# Patient Record
Sex: MALE | Race: WHITE | NOT HISPANIC OR LATINO
[De-identification: names, ages, dates, MRNs, and addresses within clinical notes are randomized per-mention and may not be internally consistent; named-entity substitution may affect disease eponyms.]

---

## 2017-05-04 ENCOUNTER — APPOINTMENT (OUTPATIENT)
Dept: UROLOGY | Facility: CLINIC | Age: 82
End: 2017-05-04

## 2017-05-04 VITALS
HEART RATE: 74 BPM | HEIGHT: 69 IN | WEIGHT: 178 LBS | BODY MASS INDEX: 26.36 KG/M2 | OXYGEN SATURATION: 98 % | DIASTOLIC BLOOD PRESSURE: 80 MMHG | SYSTOLIC BLOOD PRESSURE: 120 MMHG | RESPIRATION RATE: 14 BRPM | TEMPERATURE: 98.1 F

## 2017-05-08 LAB — PSA SERPL-MCNC: 0.15 NG/ML

## 2017-05-15 ENCOUNTER — FORM ENCOUNTER (OUTPATIENT)
Age: 82
End: 2017-05-15

## 2017-05-16 ENCOUNTER — APPOINTMENT (OUTPATIENT)
Dept: ULTRASOUND IMAGING | Facility: CLINIC | Age: 82
End: 2017-05-16

## 2017-05-16 ENCOUNTER — OUTPATIENT (OUTPATIENT)
Dept: OUTPATIENT SERVICES | Facility: HOSPITAL | Age: 82
LOS: 1 days | End: 2017-05-16
Payer: MEDICARE

## 2017-05-16 DIAGNOSIS — Z00.8 ENCOUNTER FOR OTHER GENERAL EXAMINATION: ICD-10-CM

## 2017-05-16 PROCEDURE — 76770 US EXAM ABDO BACK WALL COMP: CPT

## 2017-08-24 ENCOUNTER — APPOINTMENT (OUTPATIENT)
Dept: INTERNAL MEDICINE | Facility: CLINIC | Age: 82
End: 2017-08-24

## 2017-11-02 ENCOUNTER — APPOINTMENT (OUTPATIENT)
Dept: UROLOGY | Facility: CLINIC | Age: 82
End: 2017-11-02
Payer: MEDICARE

## 2017-11-02 VITALS
DIASTOLIC BLOOD PRESSURE: 64 MMHG | TEMPERATURE: 97.6 F | OXYGEN SATURATION: 98 % | HEART RATE: 72 BPM | SYSTOLIC BLOOD PRESSURE: 102 MMHG

## 2017-11-02 PROCEDURE — 99213 OFFICE O/P EST LOW 20 MIN: CPT

## 2017-11-03 LAB — PSA SERPL-MCNC: 0.14 NG/ML

## 2018-04-19 ENCOUNTER — APPOINTMENT (OUTPATIENT)
Dept: UROLOGY | Facility: CLINIC | Age: 83
End: 2018-04-19
Payer: MEDICARE

## 2018-04-19 VITALS
HEIGHT: 69 IN | HEART RATE: 71 BPM | OXYGEN SATURATION: 98 % | SYSTOLIC BLOOD PRESSURE: 130 MMHG | DIASTOLIC BLOOD PRESSURE: 70 MMHG

## 2018-04-19 PROCEDURE — 99213 OFFICE O/P EST LOW 20 MIN: CPT

## 2018-04-20 LAB — PSA SERPL-MCNC: 0.12 NG/ML

## 2018-05-07 ENCOUNTER — FORM ENCOUNTER (OUTPATIENT)
Age: 83
End: 2018-05-07

## 2018-05-08 ENCOUNTER — APPOINTMENT (OUTPATIENT)
Dept: ULTRASOUND IMAGING | Facility: CLINIC | Age: 83
End: 2018-05-08
Payer: MEDICARE

## 2018-05-08 ENCOUNTER — OUTPATIENT (OUTPATIENT)
Dept: OUTPATIENT SERVICES | Facility: HOSPITAL | Age: 83
LOS: 1 days | End: 2018-05-08
Payer: MEDICARE

## 2018-05-08 DIAGNOSIS — C61 MALIGNANT NEOPLASM OF PROSTATE: ICD-10-CM

## 2018-05-08 DIAGNOSIS — Z00.8 ENCOUNTER FOR OTHER GENERAL EXAMINATION: ICD-10-CM

## 2018-05-08 PROCEDURE — 76775 US EXAM ABDO BACK WALL LIM: CPT | Mod: 26

## 2018-05-08 PROCEDURE — 76775 US EXAM ABDO BACK WALL LIM: CPT

## 2018-11-14 ENCOUNTER — APPOINTMENT (OUTPATIENT)
Dept: UROLOGY | Facility: CLINIC | Age: 83
End: 2018-11-14
Payer: MEDICARE

## 2018-11-14 VITALS
TEMPERATURE: 98.2 F | OXYGEN SATURATION: 98 % | DIASTOLIC BLOOD PRESSURE: 76 MMHG | BODY MASS INDEX: 26.36 KG/M2 | WEIGHT: 178 LBS | HEART RATE: 74 BPM | RESPIRATION RATE: 14 BRPM | SYSTOLIC BLOOD PRESSURE: 130 MMHG | HEIGHT: 69 IN

## 2018-11-14 DIAGNOSIS — Z86.79 PERSONAL HISTORY OF OTHER DISEASES OF THE CIRCULATORY SYSTEM: ICD-10-CM

## 2018-11-14 DIAGNOSIS — Z86.39 PERSONAL HISTORY OF OTHER ENDOCRINE, NUTRITIONAL AND METABOLIC DISEASE: ICD-10-CM

## 2018-11-14 PROCEDURE — 99213 OFFICE O/P EST LOW 20 MIN: CPT

## 2018-11-15 LAB — PSA SERPL-MCNC: 0.1 NG/ML

## 2018-12-02 ENCOUNTER — FORM ENCOUNTER (OUTPATIENT)
Age: 83
End: 2018-12-02

## 2018-12-03 ENCOUNTER — OUTPATIENT (OUTPATIENT)
Dept: OUTPATIENT SERVICES | Facility: HOSPITAL | Age: 83
LOS: 1 days | End: 2018-12-03
Payer: MEDICARE

## 2018-12-03 ENCOUNTER — APPOINTMENT (OUTPATIENT)
Dept: ULTRASOUND IMAGING | Facility: CLINIC | Age: 83
End: 2018-12-03
Payer: MEDICARE

## 2018-12-03 DIAGNOSIS — Z00.8 ENCOUNTER FOR OTHER GENERAL EXAMINATION: ICD-10-CM

## 2018-12-03 PROCEDURE — 76775 US EXAM ABDO BACK WALL LIM: CPT

## 2018-12-03 PROCEDURE — 76775 US EXAM ABDO BACK WALL LIM: CPT | Mod: 26

## 2019-04-18 ENCOUNTER — APPOINTMENT (OUTPATIENT)
Dept: UROLOGY | Facility: CLINIC | Age: 84
End: 2019-04-18

## 2019-05-15 ENCOUNTER — APPOINTMENT (OUTPATIENT)
Dept: UROLOGY | Facility: CLINIC | Age: 84
End: 2019-05-15

## 2019-05-29 ENCOUNTER — APPOINTMENT (OUTPATIENT)
Dept: UROLOGY | Facility: CLINIC | Age: 84
End: 2019-05-29
Payer: MEDICARE

## 2019-05-29 VITALS
BODY MASS INDEX: 26.36 KG/M2 | WEIGHT: 178 LBS | DIASTOLIC BLOOD PRESSURE: 70 MMHG | HEART RATE: 64 BPM | OXYGEN SATURATION: 98 % | RESPIRATION RATE: 14 BRPM | HEIGHT: 69 IN | SYSTOLIC BLOOD PRESSURE: 130 MMHG

## 2019-05-29 PROCEDURE — 99213 OFFICE O/P EST LOW 20 MIN: CPT

## 2019-05-29 NOTE — HISTORY OF PRESENT ILLNESS
[FreeTextEntry1] : He is an 86-year-old who presents in follow-up for prostate cancer and incidental renal mass on observation. Urinary symptoms are not significantly changed which is nocturia 3 times. There is no hematuria or dysuria. There is no flank pain. PSA level was 0.1 in November 2018. Ultrasound in Dec 2018 showed increase in size of right renal mass, 3.4 and the other mass was stable at 2 cm. Residual urine was minimal before.\par Previous note: Ultrasound in 2017 showed a 17 mm right renal mass unchanged from before. Around 2005, he underwent  Cyberknide radiation therapy for prostate cancer.  In June 2016, he was hospitalized for GI bleed. MRI showed a right mid pole 2.3 cm solid lesion consistent with renal cell carcinoma.

## 2019-05-29 NOTE — ASSESSMENT
[FreeTextEntry1] : PSA level will be checked. From a urinary standpoint, he is doing well. Pending insurance approval, MRI of the abdomen will be ordered to reassess the renal masses. Since the size of the mass is increasing, I believe he should consider surgical treatment options. Partial, radical nephrectomy and other ablative minimally invasive options were discussed.

## 2019-05-29 NOTE — PHYSICAL EXAM
[General Appearance - Well Developed] : well developed [General Appearance - Well Nourished] : well nourished [Normal Appearance] : normal appearance [General Appearance - In No Acute Distress] : no acute distress [Well Groomed] : well groomed [Abdomen Tenderness] : non-tender [Abdomen Soft] : soft [Urethral Meatus] : meatus normal [Penis Abnormality] : normal uncircumcised penis [Costovertebral Angle Tenderness] : no ~M costovertebral angle tenderness [Urinary Bladder Findings] : the bladder was normal on palpation [Scrotum] : the scrotum was normal [Testes Mass (___cm)] : there were no testicular masses [Prostate Tenderness] : the prostate was not tender [Testes Tenderness] : no tenderness of the testes [FreeTextEntry1] :  RAMÓN done 11/2018 [No Prostate Nodules] : no prostate nodules [] : no respiratory distress [Oriented To Time, Place, And Person] : oriented to person, place, and time [Exaggerated Use Of Accessory Muscles For Inspiration] : no accessory muscle use [Respiration, Rhythm And Depth] : normal respiratory rhythm and effort [Not Anxious] : not anxious [Mood] : the mood was normal [Affect] : the affect was normal

## 2019-05-30 LAB — PSA SERPL-MCNC: 0.13 NG/ML

## 2019-06-11 ENCOUNTER — FORM ENCOUNTER (OUTPATIENT)
Age: 84
End: 2019-06-11

## 2019-06-12 ENCOUNTER — APPOINTMENT (OUTPATIENT)
Dept: MRI IMAGING | Facility: CLINIC | Age: 84
End: 2019-06-12
Payer: MEDICARE

## 2019-06-12 ENCOUNTER — OUTPATIENT (OUTPATIENT)
Dept: OUTPATIENT SERVICES | Facility: HOSPITAL | Age: 84
LOS: 1 days | End: 2019-06-12
Payer: MEDICARE

## 2019-06-12 DIAGNOSIS — N28.89 OTHER SPECIFIED DISORDERS OF KIDNEY AND URETER: ICD-10-CM

## 2019-06-12 DIAGNOSIS — Z00.8 ENCOUNTER FOR OTHER GENERAL EXAMINATION: ICD-10-CM

## 2019-06-12 PROCEDURE — 74183 MRI ABD W/O CNTR FLWD CNTR: CPT

## 2019-06-12 PROCEDURE — 74183 MRI ABD W/O CNTR FLWD CNTR: CPT | Mod: 26

## 2019-06-12 PROCEDURE — A9585: CPT

## 2019-06-18 ENCOUNTER — APPOINTMENT (OUTPATIENT)
Dept: UROLOGY | Facility: CLINIC | Age: 84
End: 2019-06-18
Payer: MEDICARE

## 2019-06-18 DIAGNOSIS — R91.1 SOLITARY PULMONARY NODULE: ICD-10-CM

## 2019-06-18 PROCEDURE — 99215 OFFICE O/P EST HI 40 MIN: CPT

## 2019-06-18 NOTE — ASSESSMENT
[FreeTextEntry1] : Imaging reviewed.  Findings suspicious for metastatic right kidney cancer.\par Options include continued observation (palliative care) versus surgical resection of renal mass/lymph node with SBRT/resection of lung lesion versus systemic therapy.\par Recommend CT chest and bone scan to complete metastatic workup.\par Case to be presented at  tumor board.\par All questions answered.

## 2019-06-18 NOTE — PHYSICAL EXAM

## 2019-06-18 NOTE — HISTORY OF PRESENT ILLNESS
[FreeTextEntry1] : Referred for second opinion regarding Right renal mass.\par Has been followed for slowly enlarging solid upper pole mass.  In 2016 measured 2 cm.  Recently underwent MRI abdomen w/wo IV contrast.  Findings now show 4.3 cm enhancing renal mass.  Has associated right paracaval enlarged lymph node and 1.6 cm lung lesion.  \par \par Patient remains asymptomatic.  Denies hematuria, abdominal pain, flank pain.\par No anorexia, no weight loss.  \par \par Has prior hx of prostate cancer s/p SBRT at [place] in 2005.\par \par Prior abdominal surgeries include appendectomy and open cholecystectomy.

## 2019-06-30 ENCOUNTER — FORM ENCOUNTER (OUTPATIENT)
Age: 84
End: 2019-06-30

## 2019-07-01 ENCOUNTER — APPOINTMENT (OUTPATIENT)
Dept: NUCLEAR MEDICINE | Facility: IMAGING CENTER | Age: 84
End: 2019-07-01
Payer: MEDICARE

## 2019-07-01 ENCOUNTER — OUTPATIENT (OUTPATIENT)
Dept: OUTPATIENT SERVICES | Facility: HOSPITAL | Age: 84
LOS: 1 days | End: 2019-07-01
Payer: MEDICARE

## 2019-07-01 ENCOUNTER — APPOINTMENT (OUTPATIENT)
Dept: CT IMAGING | Facility: IMAGING CENTER | Age: 84
End: 2019-07-01
Payer: MEDICARE

## 2019-07-01 DIAGNOSIS — N28.89 OTHER SPECIFIED DISORDERS OF KIDNEY AND URETER: ICD-10-CM

## 2019-07-01 DIAGNOSIS — R91.1 SOLITARY PULMONARY NODULE: ICD-10-CM

## 2019-07-01 PROCEDURE — 78306 BONE IMAGING WHOLE BODY: CPT | Mod: 26

## 2019-07-01 PROCEDURE — 71250 CT THORAX DX C-: CPT

## 2019-07-01 PROCEDURE — 78306 BONE IMAGING WHOLE BODY: CPT

## 2019-07-01 PROCEDURE — 71250 CT THORAX DX C-: CPT | Mod: 26

## 2019-07-01 PROCEDURE — A9561: CPT

## 2019-07-10 ENCOUNTER — RESULT REVIEW (OUTPATIENT)
Age: 84
End: 2019-07-10

## 2019-09-16 ENCOUNTER — MESSAGE (OUTPATIENT)
Age: 84
End: 2019-09-16

## 2019-10-01 ENCOUNTER — APPOINTMENT (OUTPATIENT)
Dept: UROLOGY | Facility: CLINIC | Age: 84
End: 2019-10-01
Payer: MEDICARE

## 2019-10-01 DIAGNOSIS — R59.0 LOCALIZED ENLARGED LYMPH NODES: ICD-10-CM

## 2019-10-01 PROCEDURE — 99215 OFFICE O/P EST HI 40 MIN: CPT

## 2019-10-06 ENCOUNTER — FORM ENCOUNTER (OUTPATIENT)
Age: 84
End: 2019-10-06

## 2019-10-07 ENCOUNTER — APPOINTMENT (OUTPATIENT)
Dept: CT IMAGING | Facility: CLINIC | Age: 84
End: 2019-10-07
Payer: MEDICARE

## 2019-10-07 ENCOUNTER — OUTPATIENT (OUTPATIENT)
Dept: OUTPATIENT SERVICES | Facility: HOSPITAL | Age: 84
LOS: 1 days | End: 2019-10-07
Payer: COMMERCIAL

## 2019-10-07 DIAGNOSIS — N28.89 OTHER SPECIFIED DISORDERS OF KIDNEY AND URETER: ICD-10-CM

## 2019-10-07 PROCEDURE — 74170 CT ABD WO CNTRST FLWD CNTRST: CPT | Mod: 26

## 2019-10-07 PROCEDURE — 74170 CT ABD WO CNTRST FLWD CNTRST: CPT

## 2019-10-07 PROCEDURE — 71260 CT THORAX DX C+: CPT | Mod: 26

## 2019-10-07 PROCEDURE — 82565 ASSAY OF CREATININE: CPT

## 2019-10-07 PROCEDURE — 71260 CT THORAX DX C+: CPT

## 2019-10-09 NOTE — ASSESSMENT
[FreeTextEntry1] : Images reviewed and findings discussed with the patient.  Recommend to proceed with minimally invasive laparoscopic partial nephrectomy, lymphadenectomy.  Reviewed operative procedure, hospital stay and recovery time.  Risks of surgery discussed including risks of bleeding (both immediate and delayed), urine leak, wound/abdominal infection, adjacent organ injury (bowel/vascular), conversion to open surgery, risk of renal loss, DVT/PE, and anesthetic complications.  Discussed the likelihood of both malignant and benign pathology.  \par \par All questions answered.  Patient agrees to proceed as recommended.  Will need repeat imaging given that last CT scan was 3 months ago.\par

## 2019-10-09 NOTE — HISTORY OF PRESENT ILLNESS
[FreeTextEntry1] : Previously on active surveillance for right renal mass.  \par Has been followed for slowly enlarging solid upper pole mass.  In 2016 measured 2 cm.  Recently underwent MRI abdomen w/wo IV contrast.  Findings now show 4.3 cm enhancing renal mass.  Has associated right paracaval enlarged lymph node and 1.6 cm lung lesion.   Initially recommended to undergo right partial nephrectomy with lymphadenectomy over the summer but the patient cancelled surgery.  Underwent second opinion with oncology who concurred with the recommendation for surgical excision.  Was seen by CT surgery at [place] for lung nodule.  \par \par Patient remains asymptomatic.  Denies hematuria, abdominal pain, flank pain.\par No anorexia, no weight loss.  \par \par Has prior hx of prostate cancer s/p SBRT at [place] in 2005.\par \par Prior abdominal surgeries include appendectomy and open cholecystectomy.

## 2019-10-22 ENCOUNTER — OUTPATIENT (OUTPATIENT)
Dept: OUTPATIENT SERVICES | Facility: HOSPITAL | Age: 84
LOS: 1 days | End: 2019-10-22
Payer: MEDICARE

## 2019-10-22 VITALS
HEART RATE: 72 BPM | DIASTOLIC BLOOD PRESSURE: 76 MMHG | RESPIRATION RATE: 14 BRPM | HEIGHT: 67.5 IN | TEMPERATURE: 97 F | WEIGHT: 153 LBS | SYSTOLIC BLOOD PRESSURE: 128 MMHG

## 2019-10-22 DIAGNOSIS — C64.9 MALIGNANT NEOPLASM OF UNSPECIFIED KIDNEY, EXCEPT RENAL PELVIS: ICD-10-CM

## 2019-10-22 DIAGNOSIS — Z01.818 ENCOUNTER FOR OTHER PREPROCEDURAL EXAMINATION: ICD-10-CM

## 2019-10-22 DIAGNOSIS — N28.89 OTHER SPECIFIED DISORDERS OF KIDNEY AND URETER: ICD-10-CM

## 2019-10-22 DIAGNOSIS — Z95.5 PRESENCE OF CORONARY ANGIOPLASTY IMPLANT AND GRAFT: ICD-10-CM

## 2019-10-22 DIAGNOSIS — Z95.5 PRESENCE OF CORONARY ANGIOPLASTY IMPLANT AND GRAFT: Chronic | ICD-10-CM

## 2019-10-22 DIAGNOSIS — Z96.649 PRESENCE OF UNSPECIFIED ARTIFICIAL HIP JOINT: Chronic | ICD-10-CM

## 2019-10-22 LAB
ALBUMIN SERPL ELPH-MCNC: 4.2 G/DL — SIGNIFICANT CHANGE UP (ref 3.3–5)
ALP SERPL-CCNC: 56 U/L — SIGNIFICANT CHANGE UP (ref 40–120)
ALT FLD-CCNC: 14 U/L — SIGNIFICANT CHANGE UP (ref 4–41)
ANION GAP SERPL CALC-SCNC: 12 MMO/L — SIGNIFICANT CHANGE UP (ref 7–14)
AST SERPL-CCNC: 18 U/L — SIGNIFICANT CHANGE UP (ref 4–40)
BILIRUB SERPL-MCNC: 0.2 MG/DL — SIGNIFICANT CHANGE UP (ref 0.2–1.2)
BLD GP AB SCN SERPL QL: NEGATIVE — SIGNIFICANT CHANGE UP
BUN SERPL-MCNC: 21 MG/DL — SIGNIFICANT CHANGE UP (ref 7–23)
CALCIUM SERPL-MCNC: 9.6 MG/DL — SIGNIFICANT CHANGE UP (ref 8.4–10.5)
CHLORIDE SERPL-SCNC: 103 MMOL/L — SIGNIFICANT CHANGE UP (ref 98–107)
CO2 SERPL-SCNC: 27 MMOL/L — SIGNIFICANT CHANGE UP (ref 22–31)
CREAT SERPL-MCNC: 0.92 MG/DL — SIGNIFICANT CHANGE UP (ref 0.5–1.3)
GLUCOSE SERPL-MCNC: 77 MG/DL — SIGNIFICANT CHANGE UP (ref 70–99)
HCT VFR BLD CALC: 37.4 % — LOW (ref 39–50)
HGB BLD-MCNC: 11.9 G/DL — LOW (ref 13–17)
MCHC RBC-ENTMCNC: 29 PG — SIGNIFICANT CHANGE UP (ref 27–34)
MCHC RBC-ENTMCNC: 31.8 % — LOW (ref 32–36)
MCV RBC AUTO: 91 FL — SIGNIFICANT CHANGE UP (ref 80–100)
NRBC # FLD: 0 K/UL — SIGNIFICANT CHANGE UP (ref 0–0)
PLATELET # BLD AUTO: 217 K/UL — SIGNIFICANT CHANGE UP (ref 150–400)
PMV BLD: 9.7 FL — SIGNIFICANT CHANGE UP (ref 7–13)
POTASSIUM SERPL-MCNC: 4.9 MMOL/L — SIGNIFICANT CHANGE UP (ref 3.5–5.3)
POTASSIUM SERPL-SCNC: 4.9 MMOL/L — SIGNIFICANT CHANGE UP (ref 3.5–5.3)
PROT SERPL-MCNC: 7.3 G/DL — SIGNIFICANT CHANGE UP (ref 6–8.3)
RBC # BLD: 4.11 M/UL — LOW (ref 4.2–5.8)
RBC # FLD: 14.8 % — HIGH (ref 10.3–14.5)
RH IG SCN BLD-IMP: POSITIVE — SIGNIFICANT CHANGE UP
SODIUM SERPL-SCNC: 142 MMOL/L — SIGNIFICANT CHANGE UP (ref 135–145)
WBC # BLD: 8.3 K/UL — SIGNIFICANT CHANGE UP (ref 3.8–10.5)
WBC # FLD AUTO: 8.3 K/UL — SIGNIFICANT CHANGE UP (ref 3.8–10.5)

## 2019-10-22 PROCEDURE — 93010 ELECTROCARDIOGRAM REPORT: CPT

## 2019-10-22 RX ORDER — UBIDECARENONE 100 MG
1 CAPSULE ORAL
Qty: 0 | Refills: 0 | DISCHARGE

## 2019-10-22 NOTE — H&P PST ADULT - NS PRO ABUSE SCREEN SUSPICION NEGLECT YN
-No acute intervention, not on any medications  -Unable to reach Dr. Vladimir Parry (neurologist) [phone]  -Continue with Baclofen 20 q6 hours (slight change in home medication) for spasticity
no

## 2019-10-22 NOTE — H&P PST ADULT - NSICDXPASTSURGICALHX_GEN_ALL_CORE_FT
PAST SURGICAL HISTORY:  History of total hip replacement left-2001, right-2016    Stented coronary artery 3 (1 is JUNIOR)-2001

## 2019-10-22 NOTE — H&P PST ADULT - NSICDXPASTMEDICALHX_GEN_ALL_CORE_FT
PAST MEDICAL HISTORY:  CAD (coronary artery disease)     Cancer of kidney     HTN (hypertension)     Hyperlipidemia

## 2019-10-22 NOTE — H&P PST ADULT - NSANTHOSAYNRD_GEN_A_CORE
No. FIDEL screening performed.  STOP BANG Legend: 0-2 = LOW Risk; 3-4 = INTERMEDIATE Risk; 5-8 = HIGH Risk

## 2019-10-22 NOTE — H&P PST ADULT - VISION (WITH CORRECTIVE LENSES IF THE PATIENT USUALLY WEARS THEM):
Anticoagulation Summary  As of 5/29/2018    INR goal:   2.5-3.5   TTR:   27.0 % (1.2 y)   Today's INR:   2.8   Warfarin maintenance plan:   7.5 mg (2.5 mg x 3) on Mon, Fri; 5 mg (2.5 mg x 2) all other days   Weekly warfarin total:   40 mg   Plan last modified:   Casey Birmingham, PharmD (5/25/2018)   Next INR check:   6/4/2018   Target end date:   Indefinite    Indications    Chronic anticoagulation [Z79.01]  History of mitral valve replacement with mechanical valve [Z95.2] [Z95.2]             Anticoagulation Episode Summary     INR check location:       Preferred lab:       Send INR reminders to:       Comments:         Anticoagulation Care Providers     Provider Role Specialty Phone number    Declan Eaton M.D. Referring Internal Medicine [phone]    [facility]tico[facility] Services Responsible  [phone]        Anticoagulation Patient Findings  Patient Findings     Negatives:   Signs/symptoms of thrombosis, Signs/symptoms of bleeding, Laboratory test error suspected, Change in health, Change in alcohol use, Change in activity, Upcoming invasive procedure, Emergency department visit, Upcoming dental procedure, Missed doses, Extra doses, Change in medications, Change in diet/appetite, Hospital admission, Bruising, Other complaints        HPI:   Carlos Rivera seen in clinic today, on anticoagulation therapy with warfarin for stroke prevention due to history of mechanical mitral valve replacement.    Patient's previous INR was subtherapeutic at 2.1 on 5-25-18, at which time patient was instructed to increase weekly warfarin regimen and begin lovenox bridge.  He returns to clinic today to recheck INR to ensure it is therapeutic and thus preventing possible clotting and/or bleeding/bruising complications.    CHADS-VASc = n/a  (unadjusted ischemic stroke risk/year:  n/a)    Does patient have any changes to current medical/health status since last appt (Y/N):  NO  Does patient have any signs/symptoms of bleeding  "and/or thrombosis since the last appt (Y/N):  NO  Does patient have any interval changes to diet or medications since last appt (Y/N):  NO  Are there any complications or cost restrictions with current therapy (Y/N):  NO      Vitals:  BP check declined at today's visit    Weight  Scale unavailable    Height   5' 8\"     Asssessment:      INR therapeutic at 2.8, therefore decreasing patient's risk of stroke and/or bleeding complications.   Reason(s) for out of range INR today:  n/a      Plan:  Pt is to continue with current warfarin dosing regimen in order to maintain INR in therapeutic range.     Follow up:  Because warfarin is a high risk medication and current CHEST guidelines recommend regular monitoring intervals (few days up to 12 weeks), will have patient return to clinic in 1 weeks to recheck INR.    Casey Birmingham, PharmD    " wears eyeglasses

## 2019-10-22 NOTE — H&P PST ADULT - NSICDXPROBLEM_GEN_ALL_CORE_FT
PROBLEM DIAGNOSES  Problem: Cancer of kidney  Assessment and Plan: Pt. is scheduled for a right laparoscopic partial nephrectomy, retroperitoneal lymphadenectomy 11/1/19.  Pt. verbalized understanding of instructions as discussed and outlined on the instruction sheets.  Pt. did teach back on Chlorhexidine wash.  Pt. to have medical clearance 10/24/19.      Problem: Stented coronary artery  Assessment and Plan: Instructed pt. to take last dose of Clopidogrel 10/26/19 and to continue baby ASA.  Informed pt's. Cardiologist who concurs.

## 2019-10-22 NOTE — H&P PST ADULT - ENT GEN HX ROS MEA POS PC
"when I sleep with my mouth open"/dry mouth hearing difficulty/dry mouth/"when I sleep with my mouth open"

## 2019-10-24 LAB
BACTERIA UR CULT: SIGNIFICANT CHANGE UP
SPECIMEN SOURCE: SIGNIFICANT CHANGE UP

## 2019-10-31 ENCOUNTER — TRANSCRIPTION ENCOUNTER (OUTPATIENT)
Age: 84
End: 2019-10-31

## 2019-10-31 PROBLEM — E78.5 HYPERLIPIDEMIA, UNSPECIFIED: Chronic | Status: ACTIVE | Noted: 2019-10-22

## 2019-10-31 PROBLEM — I25.10 ATHEROSCLEROTIC HEART DISEASE OF NATIVE CORONARY ARTERY WITHOUT ANGINA PECTORIS: Chronic | Status: ACTIVE | Noted: 2019-10-22

## 2019-10-31 PROBLEM — C64.9 MALIGNANT NEOPLASM OF UNSPECIFIED KIDNEY, EXCEPT RENAL PELVIS: Chronic | Status: ACTIVE | Noted: 2019-10-22

## 2019-10-31 PROBLEM — I10 ESSENTIAL (PRIMARY) HYPERTENSION: Chronic | Status: ACTIVE | Noted: 2019-10-22

## 2019-10-31 NOTE — ASU PATIENT PROFILE, ADULT - VISION (WITH CORRECTIVE LENSES IF THE PATIENT USUALLY WEARS THEM):
wears eyeglasses wears eyeglasses/Partially impaired: cannot see medication labels or newsprint, but can see obstacles in path, and the surrounding layout; can count fingers at arm's length

## 2019-10-31 NOTE — ASU PATIENT PROFILE, ADULT - PSH
History of total hip replacement  left-2001, right-2016  Stented coronary artery  3 (1 is JUNIOR)-2001

## 2019-11-01 ENCOUNTER — RESULT REVIEW (OUTPATIENT)
Age: 84
End: 2019-11-01

## 2019-11-01 ENCOUNTER — INPATIENT (INPATIENT)
Facility: HOSPITAL | Age: 84
LOS: 3 days | Discharge: HOME | End: 2019-11-05
Attending: UROLOGY | Admitting: UROLOGY
Payer: MEDICARE

## 2019-11-01 ENCOUNTER — APPOINTMENT (OUTPATIENT)
Dept: UROLOGY | Facility: HOSPITAL | Age: 84
End: 2019-11-01

## 2019-11-01 VITALS
HEART RATE: 69 BPM | DIASTOLIC BLOOD PRESSURE: 60 MMHG | TEMPERATURE: 98 F | HEIGHT: 67.5 IN | WEIGHT: 153 LBS | SYSTOLIC BLOOD PRESSURE: 150 MMHG | OXYGEN SATURATION: 100 % | RESPIRATION RATE: 16 BRPM

## 2019-11-01 DIAGNOSIS — R59.0 LOCALIZED ENLARGED LYMPH NODES: ICD-10-CM

## 2019-11-01 DIAGNOSIS — Z95.5 PRESENCE OF CORONARY ANGIOPLASTY IMPLANT AND GRAFT: Chronic | ICD-10-CM

## 2019-11-01 DIAGNOSIS — Z96.649 PRESENCE OF UNSPECIFIED ARTIFICIAL HIP JOINT: Chronic | ICD-10-CM

## 2019-11-01 DIAGNOSIS — C64.9 MALIGNANT NEOPLASM OF UNSPECIFIED KIDNEY, EXCEPT RENAL PELVIS: ICD-10-CM

## 2019-11-01 LAB
BASE EXCESS BLDA CALC-SCNC: 0.6 MMOL/L — SIGNIFICANT CHANGE UP
CA-I BLDA-SCNC: 1.2 MMOL/L — SIGNIFICANT CHANGE UP (ref 1.15–1.29)
GLUCOSE BLDA-MCNC: 141 MG/DL — HIGH (ref 70–99)
HCO3 BLDA-SCNC: 25 MMOL/L — SIGNIFICANT CHANGE UP (ref 22–26)
HCT VFR BLDA CALC: 29 % — LOW (ref 39–51)
HGB BLDA-MCNC: 9.3 G/DL — LOW (ref 13–17)
PCO2 BLDA: 43 MMHG — SIGNIFICANT CHANGE UP (ref 35–48)
PH BLDA: 7.38 PH — SIGNIFICANT CHANGE UP (ref 7.35–7.45)
PO2 BLDA: 198 MMHG — HIGH (ref 83–108)
POTASSIUM BLDA-SCNC: 3.8 MMOL/L — SIGNIFICANT CHANGE UP (ref 3.4–4.5)
RH IG SCN BLD-IMP: POSITIVE — SIGNIFICANT CHANGE UP
SAO2 % BLDA: 99.5 % — HIGH (ref 95–99)
SODIUM BLDA-SCNC: 137 MMOL/L — SIGNIFICANT CHANGE UP (ref 136–146)

## 2019-11-01 PROCEDURE — 38780 REMOVE ABDOMEN LYMPH NODES: CPT

## 2019-11-01 PROCEDURE — 88312 SPECIAL STAINS GROUP 1: CPT | Mod: 26

## 2019-11-01 PROCEDURE — 50543 LAPARO PARTIAL NEPHRECTOMY: CPT | Mod: RT

## 2019-11-01 PROCEDURE — 88307 TISSUE EXAM BY PATHOLOGIST: CPT | Mod: 26

## 2019-11-01 PROCEDURE — 76998 US GUIDE INTRAOP: CPT | Mod: 26

## 2019-11-01 PROCEDURE — 88305 TISSUE EXAM BY PATHOLOGIST: CPT | Mod: 26

## 2019-11-01 RX ORDER — OXYCODONE HYDROCHLORIDE 5 MG/1
10 TABLET ORAL EVERY 6 HOURS
Refills: 0 | Status: DISCONTINUED | OUTPATIENT
Start: 2019-11-01 | End: 2019-11-05

## 2019-11-01 RX ORDER — POLYETHYLENE GLYCOL 3350 17 G/17G
17 POWDER, FOR SOLUTION ORAL DAILY
Refills: 0 | Status: DISCONTINUED | OUTPATIENT
Start: 2019-11-01 | End: 2019-11-05

## 2019-11-01 RX ORDER — SIMVASTATIN 20 MG/1
20 TABLET, FILM COATED ORAL AT BEDTIME
Refills: 0 | Status: DISCONTINUED | OUTPATIENT
Start: 2019-11-01 | End: 2019-11-05

## 2019-11-01 RX ORDER — SENNA PLUS 8.6 MG/1
2 TABLET ORAL AT BEDTIME
Refills: 0 | Status: DISCONTINUED | OUTPATIENT
Start: 2019-11-01 | End: 2019-11-05

## 2019-11-01 RX ORDER — ASPIRIN/CALCIUM CARB/MAGNESIUM 324 MG
81 TABLET ORAL DAILY
Refills: 0 | Status: DISCONTINUED | OUTPATIENT
Start: 2019-11-01 | End: 2019-11-05

## 2019-11-01 RX ORDER — LISINOPRIL 2.5 MG/1
20 TABLET ORAL DAILY
Refills: 0 | Status: DISCONTINUED | OUTPATIENT
Start: 2019-11-01 | End: 2019-11-05

## 2019-11-01 RX ORDER — CEFAZOLIN SODIUM 1 G
2000 VIAL (EA) INJECTION EVERY 8 HOURS
Refills: 0 | Status: COMPLETED | OUTPATIENT
Start: 2019-11-01 | End: 2019-11-02

## 2019-11-01 RX ORDER — OXYCODONE HYDROCHLORIDE 5 MG/1
5 TABLET ORAL EVERY 6 HOURS
Refills: 0 | Status: DISCONTINUED | OUTPATIENT
Start: 2019-11-01 | End: 2019-11-05

## 2019-11-01 RX ORDER — UBIDECARENONE 100 MG
1 CAPSULE ORAL
Qty: 0 | Refills: 0 | DISCHARGE

## 2019-11-01 RX ORDER — ONDANSETRON 8 MG/1
4 TABLET, FILM COATED ORAL ONCE
Refills: 0 | Status: COMPLETED | OUTPATIENT
Start: 2019-11-01 | End: 2019-11-01

## 2019-11-01 RX ORDER — OMEPRAZOLE 10 MG/1
1 CAPSULE, DELAYED RELEASE ORAL
Qty: 0 | Refills: 0 | DISCHARGE

## 2019-11-01 RX ORDER — ASPIRIN/CALCIUM CARB/MAGNESIUM 324 MG
1 TABLET ORAL
Qty: 0 | Refills: 0 | DISCHARGE

## 2019-11-01 RX ORDER — CARVEDILOL PHOSPHATE 80 MG/1
1 CAPSULE, EXTENDED RELEASE ORAL
Qty: 0 | Refills: 0 | DISCHARGE

## 2019-11-01 RX ORDER — INFLUENZA VIRUS VACCINE 15; 15; 15; 15 UG/.5ML; UG/.5ML; UG/.5ML; UG/.5ML
0.5 SUSPENSION INTRAMUSCULAR ONCE
Refills: 0 | Status: DISCONTINUED | OUTPATIENT
Start: 2019-11-01 | End: 2019-11-05

## 2019-11-01 RX ORDER — RAMIPRIL 5 MG
1 CAPSULE ORAL
Qty: 0 | Refills: 0 | DISCHARGE

## 2019-11-01 RX ORDER — HYDROMORPHONE HYDROCHLORIDE 2 MG/ML
0.5 INJECTION INTRAMUSCULAR; INTRAVENOUS; SUBCUTANEOUS
Refills: 0 | Status: DISCONTINUED | OUTPATIENT
Start: 2019-11-01 | End: 2019-11-05

## 2019-11-01 RX ORDER — PANTOPRAZOLE SODIUM 20 MG/1
40 TABLET, DELAYED RELEASE ORAL
Refills: 0 | Status: DISCONTINUED | OUTPATIENT
Start: 2019-11-01 | End: 2019-11-05

## 2019-11-01 RX ORDER — HYDRALAZINE HCL 50 MG
10 TABLET ORAL ONCE
Refills: 0 | Status: COMPLETED | OUTPATIENT
Start: 2019-11-01 | End: 2019-11-01

## 2019-11-01 RX ORDER — HEPARIN SODIUM 5000 [USP'U]/ML
5000 INJECTION INTRAVENOUS; SUBCUTANEOUS EVERY 8 HOURS
Refills: 0 | Status: DISCONTINUED | OUTPATIENT
Start: 2019-11-01 | End: 2019-11-05

## 2019-11-01 RX ORDER — CARVEDILOL PHOSPHATE 80 MG/1
12.5 CAPSULE, EXTENDED RELEASE ORAL EVERY 12 HOURS
Refills: 0 | Status: DISCONTINUED | OUTPATIENT
Start: 2019-11-01 | End: 2019-11-05

## 2019-11-01 RX ORDER — SODIUM CHLORIDE 9 MG/ML
1000 INJECTION, SOLUTION INTRAVENOUS
Refills: 0 | Status: DISCONTINUED | OUTPATIENT
Start: 2019-11-01 | End: 2019-11-02

## 2019-11-01 RX ORDER — SIMVASTATIN 20 MG/1
1 TABLET, FILM COATED ORAL
Qty: 0 | Refills: 0 | DISCHARGE

## 2019-11-01 RX ORDER — ACETAMINOPHEN 500 MG
650 TABLET ORAL EVERY 6 HOURS
Refills: 0 | Status: DISCONTINUED | OUTPATIENT
Start: 2019-11-01 | End: 2019-11-05

## 2019-11-01 RX ADMIN — SIMVASTATIN 20 MILLIGRAM(S): 20 TABLET, FILM COATED ORAL at 21:33

## 2019-11-01 RX ADMIN — SENNA PLUS 2 TABLET(S): 8.6 TABLET ORAL at 21:33

## 2019-11-01 RX ADMIN — HYDROMORPHONE HYDROCHLORIDE 0.5 MILLIGRAM(S): 2 INJECTION INTRAMUSCULAR; INTRAVENOUS; SUBCUTANEOUS at 16:45

## 2019-11-01 RX ADMIN — Medication 100 MILLIGRAM(S): at 21:32

## 2019-11-01 RX ADMIN — HYDROMORPHONE HYDROCHLORIDE 0.5 MILLIGRAM(S): 2 INJECTION INTRAMUSCULAR; INTRAVENOUS; SUBCUTANEOUS at 16:30

## 2019-11-01 RX ADMIN — HEPARIN SODIUM 5000 UNIT(S): 5000 INJECTION INTRAVENOUS; SUBCUTANEOUS at 21:33

## 2019-11-01 RX ADMIN — Medication 10 MILLIGRAM(S): at 16:35

## 2019-11-01 NOTE — PROGRESS NOTE ADULT - SUBJECTIVE AND OBJECTIVE BOX
Note    Post op Check    s/p : R lap part nephx, RPLND    Pt seen / examined without complaints pain controlled    Vital Signs Last 24 Hrs  T(C): 36.3 (01 Nov 2019 20:20), Max: 36.8 (01 Nov 2019 11:00)  T(F): 97.3 (01 Nov 2019 20:20), Max: 98.2 (01 Nov 2019 11:00)  HR: 93 (01 Nov 2019 20:20) (66 - 93)  BP: 142/66 (01 Nov 2019 20:20) (114/55 - 188/84)  BP(mean): 69 (01 Nov 2019 19:15) (65 - 105)  RR: 15 (01 Nov 2019 20:20) (10 - 18)  SpO2: 98% (01 Nov 2019 20:20) (96% - 100%)    I&O's Summary    01 Nov 2019 07:01  -  01 Nov 2019 22:04  --------------------------------------------------------  IN: 230 mL / OUT: 345 mL / NET: -115 mL      Fol=450  FT=500    PHYSICAL EXAM:       Constitutional: awake alert NAD    Respiratory: no resp distress    Cardiovascular: RR    Gastrointestinal: soft,  appropriately tender, trocar sites CDI    Genitourinary: mckeon in place draining well    Extremities: + venodynes

## 2019-11-01 NOTE — PATIENT PROFILE ADULT - SAFE PLACE TO LIVE
Patient without complaints  passing flatus, tolerating diet  VS- wnl  Heart s1 s2 no murmur  Lungs clear bilateral  Abdomen incision clean dry and intact  positive bowel sounds  normal lochial flow  no calf tenderness  A/P- s/p csection, POD#2  Continue same management I have personally seen/examined the patient and agree with history, assessment, and plan as documented above. no

## 2019-11-02 LAB
ANION GAP SERPL CALC-SCNC: 11 MMO/L — SIGNIFICANT CHANGE UP (ref 7–14)
BUN SERPL-MCNC: 28 MG/DL — HIGH (ref 7–23)
CALCIUM SERPL-MCNC: 8.6 MG/DL — SIGNIFICANT CHANGE UP (ref 8.4–10.5)
CHLORIDE SERPL-SCNC: 103 MMOL/L — SIGNIFICANT CHANGE UP (ref 98–107)
CO2 SERPL-SCNC: 25 MMOL/L — SIGNIFICANT CHANGE UP (ref 22–31)
CREAT SERPL-MCNC: 1.61 MG/DL — HIGH (ref 0.5–1.3)
GLUCOSE SERPL-MCNC: 143 MG/DL — HIGH (ref 70–99)
HCT VFR BLD CALC: 27 % — LOW (ref 39–50)
HGB BLD-MCNC: 8.6 G/DL — LOW (ref 13–17)
MCHC RBC-ENTMCNC: 29.1 PG — SIGNIFICANT CHANGE UP (ref 27–34)
MCHC RBC-ENTMCNC: 31.9 % — LOW (ref 32–36)
MCV RBC AUTO: 91.2 FL — SIGNIFICANT CHANGE UP (ref 80–100)
NRBC # FLD: 0 K/UL — SIGNIFICANT CHANGE UP (ref 0–0)
PLATELET # BLD AUTO: 190 K/UL — SIGNIFICANT CHANGE UP (ref 150–400)
PMV BLD: 9.9 FL — SIGNIFICANT CHANGE UP (ref 7–13)
POTASSIUM SERPL-MCNC: 4.9 MMOL/L — SIGNIFICANT CHANGE UP (ref 3.5–5.3)
POTASSIUM SERPL-SCNC: 4.9 MMOL/L — SIGNIFICANT CHANGE UP (ref 3.5–5.3)
RBC # BLD: 2.96 M/UL — LOW (ref 4.2–5.8)
RBC # FLD: 14.8 % — HIGH (ref 10.3–14.5)
SODIUM SERPL-SCNC: 139 MMOL/L — SIGNIFICANT CHANGE UP (ref 135–145)
WBC # BLD: 12.42 K/UL — HIGH (ref 3.8–10.5)
WBC # FLD AUTO: 12.42 K/UL — HIGH (ref 3.8–10.5)

## 2019-11-02 RX ORDER — SODIUM CHLORIDE 9 MG/ML
1000 INJECTION, SOLUTION INTRAVENOUS
Refills: 0 | Status: DISCONTINUED | OUTPATIENT
Start: 2019-11-02 | End: 2019-11-03

## 2019-11-02 RX ADMIN — CARVEDILOL PHOSPHATE 12.5 MILLIGRAM(S): 80 CAPSULE, EXTENDED RELEASE ORAL at 06:16

## 2019-11-02 RX ADMIN — Medication 650 MILLIGRAM(S): at 12:41

## 2019-11-02 RX ADMIN — Medication 100 MILLIGRAM(S): at 15:08

## 2019-11-02 RX ADMIN — HEPARIN SODIUM 5000 UNIT(S): 5000 INJECTION INTRAVENOUS; SUBCUTANEOUS at 15:08

## 2019-11-02 RX ADMIN — CARVEDILOL PHOSPHATE 12.5 MILLIGRAM(S): 80 CAPSULE, EXTENDED RELEASE ORAL at 18:22

## 2019-11-02 RX ADMIN — Medication 81 MILLIGRAM(S): at 12:38

## 2019-11-02 RX ADMIN — PANTOPRAZOLE SODIUM 40 MILLIGRAM(S): 20 TABLET, DELAYED RELEASE ORAL at 06:16

## 2019-11-02 RX ADMIN — HEPARIN SODIUM 5000 UNIT(S): 5000 INJECTION INTRAVENOUS; SUBCUTANEOUS at 06:16

## 2019-11-02 RX ADMIN — LISINOPRIL 20 MILLIGRAM(S): 2.5 TABLET ORAL at 06:16

## 2019-11-02 RX ADMIN — POLYETHYLENE GLYCOL 3350 17 GRAM(S): 17 POWDER, FOR SOLUTION ORAL at 12:38

## 2019-11-02 RX ADMIN — SODIUM CHLORIDE 75 MILLILITER(S): 9 INJECTION, SOLUTION INTRAVENOUS at 10:00

## 2019-11-02 RX ADMIN — HEPARIN SODIUM 5000 UNIT(S): 5000 INJECTION INTRAVENOUS; SUBCUTANEOUS at 23:07

## 2019-11-02 RX ADMIN — SIMVASTATIN 20 MILLIGRAM(S): 20 TABLET, FILM COATED ORAL at 23:07

## 2019-11-02 RX ADMIN — Medication 650 MILLIGRAM(S): at 13:35

## 2019-11-02 RX ADMIN — Medication 100 MILLIGRAM(S): at 06:16

## 2019-11-02 NOTE — PROGRESS NOTE ADULT - SUBJECTIVE AND OBJECTIVE BOX
Subjective  Pt seen and examined at bedside. post op check was good no overnight events. pt feeling well. no n/v/d/f/c. mckeon removed    Objective    Vital signs  T(F): , Max: 98.2 (11-01-19 @ 11:00)  HR: 90 (11-02-19 @ 09:15)  BP: 115/55 (11-02-19 @ 09:15)  SpO2: 100% (11-02-19 @ 09:15)  Wt(kg): --    Output     OUT:    Bulb: 287.5 mL    Indwelling Catheter - Urethral: 650 mL  Total OUT: 937.5 mL    Total NET: -937.5 mL      OUT:    Bulb: 55 mL    Voided: 50 mL  Total OUT: 105 mL    Total NET: -105 mL          Gen: NAD  Abd: S/NT/ND, incisions c/d/i. JAMES ss to suction  : Mckeon removed CYU    Labs      11-02 @ 05:40    WBC 12.42 / Hct 27.0  / SCr 1.61

## 2019-11-02 NOTE — PROGRESS NOTE ADULT - ASSESSMENT
This is an 87 M POD 1 s/p R lap partial nephrectomy    Plan  -am labs reviewed  -oob/ambi/IS/dvt ppx LILIBETH  -CLD adv with GIF  -MIVF  -PVR  -f/u med  -pm dulcolax if no GIF

## 2019-11-03 LAB
ANION GAP SERPL CALC-SCNC: 9 MMO/L — SIGNIFICANT CHANGE UP (ref 7–14)
BUN SERPL-MCNC: 31 MG/DL — HIGH (ref 7–23)
CALCIUM SERPL-MCNC: 8.1 MG/DL — LOW (ref 8.4–10.5)
CHLORIDE SERPL-SCNC: 102 MMOL/L — SIGNIFICANT CHANGE UP (ref 98–107)
CO2 SERPL-SCNC: 23 MMOL/L — SIGNIFICANT CHANGE UP (ref 22–31)
CREAT FLD-MCNC: 2.11 MG/DL — SIGNIFICANT CHANGE UP
CREAT SERPL-MCNC: 2 MG/DL — HIGH (ref 0.5–1.3)
GLUCOSE SERPL-MCNC: 122 MG/DL — HIGH (ref 70–99)
HCT VFR BLD CALC: 23.9 % — LOW (ref 39–50)
HCT VFR BLD CALC: 26.7 % — LOW (ref 39–50)
HGB BLD-MCNC: 7.6 G/DL — LOW (ref 13–17)
HGB BLD-MCNC: 8.3 G/DL — LOW (ref 13–17)
MCHC RBC-ENTMCNC: 28.9 PG — SIGNIFICANT CHANGE UP (ref 27–34)
MCHC RBC-ENTMCNC: 29.2 PG — SIGNIFICANT CHANGE UP (ref 27–34)
MCHC RBC-ENTMCNC: 31.1 % — LOW (ref 32–36)
MCHC RBC-ENTMCNC: 31.8 % — LOW (ref 32–36)
MCV RBC AUTO: 91.9 FL — SIGNIFICANT CHANGE UP (ref 80–100)
MCV RBC AUTO: 93 FL — SIGNIFICANT CHANGE UP (ref 80–100)
NRBC # FLD: 0 K/UL — SIGNIFICANT CHANGE UP (ref 0–0)
NRBC # FLD: 0 K/UL — SIGNIFICANT CHANGE UP (ref 0–0)
PLATELET # BLD AUTO: 156 K/UL — SIGNIFICANT CHANGE UP (ref 150–400)
PLATELET # BLD AUTO: 193 K/UL — SIGNIFICANT CHANGE UP (ref 150–400)
PMV BLD: 10.2 FL — SIGNIFICANT CHANGE UP (ref 7–13)
PMV BLD: 10.2 FL — SIGNIFICANT CHANGE UP (ref 7–13)
POTASSIUM SERPL-MCNC: 4.1 MMOL/L — SIGNIFICANT CHANGE UP (ref 3.5–5.3)
POTASSIUM SERPL-SCNC: 4.1 MMOL/L — SIGNIFICANT CHANGE UP (ref 3.5–5.3)
RBC # BLD: 2.6 M/UL — LOW (ref 4.2–5.8)
RBC # BLD: 2.87 M/UL — LOW (ref 4.2–5.8)
RBC # FLD: 14.9 % — HIGH (ref 10.3–14.5)
RBC # FLD: 15 % — HIGH (ref 10.3–14.5)
SODIUM SERPL-SCNC: 134 MMOL/L — LOW (ref 135–145)
WBC # BLD: 11.69 K/UL — HIGH (ref 3.8–10.5)
WBC # BLD: 17.19 K/UL — HIGH (ref 3.8–10.5)
WBC # FLD AUTO: 11.69 K/UL — HIGH (ref 3.8–10.5)
WBC # FLD AUTO: 17.19 K/UL — HIGH (ref 3.8–10.5)

## 2019-11-03 RX ORDER — SODIUM CHLORIDE 9 MG/ML
1000 INJECTION, SOLUTION INTRAVENOUS
Refills: 0 | Status: DISCONTINUED | OUTPATIENT
Start: 2019-11-03 | End: 2019-11-04

## 2019-11-03 RX ADMIN — SODIUM CHLORIDE 75 MILLILITER(S): 9 INJECTION, SOLUTION INTRAVENOUS at 05:27

## 2019-11-03 RX ADMIN — SODIUM CHLORIDE 75 MILLILITER(S): 9 INJECTION, SOLUTION INTRAVENOUS at 18:58

## 2019-11-03 RX ADMIN — Medication 650 MILLIGRAM(S): at 08:26

## 2019-11-03 RX ADMIN — Medication 81 MILLIGRAM(S): at 12:23

## 2019-11-03 RX ADMIN — SIMVASTATIN 20 MILLIGRAM(S): 20 TABLET, FILM COATED ORAL at 21:38

## 2019-11-03 RX ADMIN — HEPARIN SODIUM 5000 UNIT(S): 5000 INJECTION INTRAVENOUS; SUBCUTANEOUS at 05:25

## 2019-11-03 RX ADMIN — SENNA PLUS 2 TABLET(S): 8.6 TABLET ORAL at 21:38

## 2019-11-03 RX ADMIN — LISINOPRIL 20 MILLIGRAM(S): 2.5 TABLET ORAL at 05:26

## 2019-11-03 RX ADMIN — HEPARIN SODIUM 5000 UNIT(S): 5000 INJECTION INTRAVENOUS; SUBCUTANEOUS at 21:38

## 2019-11-03 RX ADMIN — HEPARIN SODIUM 5000 UNIT(S): 5000 INJECTION INTRAVENOUS; SUBCUTANEOUS at 15:02

## 2019-11-03 RX ADMIN — Medication 650 MILLIGRAM(S): at 09:25

## 2019-11-03 RX ADMIN — PANTOPRAZOLE SODIUM 40 MILLIGRAM(S): 20 TABLET, DELAYED RELEASE ORAL at 05:26

## 2019-11-03 RX ADMIN — CARVEDILOL PHOSPHATE 12.5 MILLIGRAM(S): 80 CAPSULE, EXTENDED RELEASE ORAL at 05:26

## 2019-11-03 NOTE — PROGRESS NOTE ADULT - SUBJECTIVE AND OBJECTIVE BOX
Subjective  Pt seen and examined at bedside. No overnight events. Pt feeling well. +flatus, +BM. Voiding without difficulty. No n/v/d/f/c.    Objective    Vital signs  T(F): , Max: 98.9 (11-03-19 @ 01:40)  HR: 96 (11-03-19 @ 05:22)  BP: 147/66 (11-03-19 @ 05:22)  SpO2: 97% (11-03-19 @ 05:22)  Wt(kg): --    Output     OUT:    Bulb: 215 mL    Voided: 1215 mL  Total OUT: 1430 mL    Total NET: -1430 mL          Gen: NAD  Abd: soft, appropriately tender, nondistended. incisions c/d/i    Labs    11-03 @ 05:15    WBC 11.69 / Hct 23.9  / SCr 2.00     11-02 @ 05:40    WBC 12.42 / Hct 27.0  / SCr 1.61

## 2019-11-03 NOTE — PROGRESS NOTE ADULT - ASSESSMENT
87 year old man POD2 s/p right lap partial nephrectomy    Plan:  - AM labs reviewed  - JAMES Cr 2.11  - oob/ambi/IS/dvt ppx SQH  - reg diet  - IV lock  - f/u med

## 2019-11-04 DIAGNOSIS — Z29.9 ENCOUNTER FOR PROPHYLACTIC MEASURES, UNSPECIFIED: ICD-10-CM

## 2019-11-04 DIAGNOSIS — E78.5 HYPERLIPIDEMIA, UNSPECIFIED: ICD-10-CM

## 2019-11-04 DIAGNOSIS — C64.1 MALIGNANT NEOPLASM OF RIGHT KIDNEY, EXCEPT RENAL PELVIS: ICD-10-CM

## 2019-11-04 DIAGNOSIS — I10 ESSENTIAL (PRIMARY) HYPERTENSION: ICD-10-CM

## 2019-11-04 DIAGNOSIS — D62 ACUTE POSTHEMORRHAGIC ANEMIA: ICD-10-CM

## 2019-11-04 DIAGNOSIS — N17.9 ACUTE KIDNEY FAILURE, UNSPECIFIED: ICD-10-CM

## 2019-11-04 DIAGNOSIS — I25.10 ATHEROSCLEROTIC HEART DISEASE OF NATIVE CORONARY ARTERY WITHOUT ANGINA PECTORIS: ICD-10-CM

## 2019-11-04 LAB
ANION GAP SERPL CALC-SCNC: 9 MMO/L — SIGNIFICANT CHANGE UP (ref 7–14)
BLD GP AB SCN SERPL QL: NEGATIVE — SIGNIFICANT CHANGE UP
BUN SERPL-MCNC: 34 MG/DL — HIGH (ref 7–23)
CALCIUM SERPL-MCNC: 8.1 MG/DL — LOW (ref 8.4–10.5)
CHLORIDE SERPL-SCNC: 103 MMOL/L — SIGNIFICANT CHANGE UP (ref 98–107)
CO2 SERPL-SCNC: 24 MMOL/L — SIGNIFICANT CHANGE UP (ref 22–31)
CREAT SERPL-MCNC: 2.03 MG/DL — HIGH (ref 0.5–1.3)
GLUCOSE SERPL-MCNC: 107 MG/DL — HIGH (ref 70–99)
HCT VFR BLD CALC: 22.7 % — LOW (ref 39–50)
HCT VFR BLD CALC: 27.9 % — LOW (ref 39–50)
HGB BLD-MCNC: 7.1 G/DL — LOW (ref 13–17)
HGB BLD-MCNC: 8.8 G/DL — LOW (ref 13–17)
MCHC RBC-ENTMCNC: 28.6 PG — SIGNIFICANT CHANGE UP (ref 27–34)
MCHC RBC-ENTMCNC: 28.9 PG — SIGNIFICANT CHANGE UP (ref 27–34)
MCHC RBC-ENTMCNC: 31.3 % — LOW (ref 32–36)
MCHC RBC-ENTMCNC: 31.5 % — LOW (ref 32–36)
MCV RBC AUTO: 90.6 FL — SIGNIFICANT CHANGE UP (ref 80–100)
MCV RBC AUTO: 92.3 FL — SIGNIFICANT CHANGE UP (ref 80–100)
NRBC # FLD: 0 K/UL — SIGNIFICANT CHANGE UP (ref 0–0)
NRBC # FLD: 0 K/UL — SIGNIFICANT CHANGE UP (ref 0–0)
PLATELET # BLD AUTO: 166 K/UL — SIGNIFICANT CHANGE UP (ref 150–400)
PLATELET # BLD AUTO: 196 K/UL — SIGNIFICANT CHANGE UP (ref 150–400)
PMV BLD: 10.1 FL — SIGNIFICANT CHANGE UP (ref 7–13)
PMV BLD: 10.8 FL — SIGNIFICANT CHANGE UP (ref 7–13)
POTASSIUM SERPL-MCNC: 4.2 MMOL/L — SIGNIFICANT CHANGE UP (ref 3.5–5.3)
POTASSIUM SERPL-SCNC: 4.2 MMOL/L — SIGNIFICANT CHANGE UP (ref 3.5–5.3)
RBC # BLD: 2.46 M/UL — LOW (ref 4.2–5.8)
RBC # BLD: 3.08 M/UL — LOW (ref 4.2–5.8)
RBC # FLD: 15.1 % — HIGH (ref 10.3–14.5)
RBC # FLD: 15.9 % — HIGH (ref 10.3–14.5)
RH IG SCN BLD-IMP: POSITIVE — SIGNIFICANT CHANGE UP
SODIUM SERPL-SCNC: 136 MMOL/L — SIGNIFICANT CHANGE UP (ref 135–145)
WBC # BLD: 10.88 K/UL — HIGH (ref 3.8–10.5)
WBC # BLD: 11.41 K/UL — HIGH (ref 3.8–10.5)
WBC # FLD AUTO: 10.88 K/UL — HIGH (ref 3.8–10.5)
WBC # FLD AUTO: 11.41 K/UL — HIGH (ref 3.8–10.5)

## 2019-11-04 PROCEDURE — 86079 PHYS BLOOD BANK SERV AUTHRJ: CPT

## 2019-11-04 PROCEDURE — 99223 1ST HOSP IP/OBS HIGH 75: CPT

## 2019-11-04 RX ADMIN — HEPARIN SODIUM 5000 UNIT(S): 5000 INJECTION INTRAVENOUS; SUBCUTANEOUS at 06:14

## 2019-11-04 RX ADMIN — CARVEDILOL PHOSPHATE 12.5 MILLIGRAM(S): 80 CAPSULE, EXTENDED RELEASE ORAL at 06:14

## 2019-11-04 RX ADMIN — HEPARIN SODIUM 5000 UNIT(S): 5000 INJECTION INTRAVENOUS; SUBCUTANEOUS at 21:50

## 2019-11-04 RX ADMIN — PANTOPRAZOLE SODIUM 40 MILLIGRAM(S): 20 TABLET, DELAYED RELEASE ORAL at 06:15

## 2019-11-04 RX ADMIN — LISINOPRIL 20 MILLIGRAM(S): 2.5 TABLET ORAL at 06:15

## 2019-11-04 RX ADMIN — HEPARIN SODIUM 5000 UNIT(S): 5000 INJECTION INTRAVENOUS; SUBCUTANEOUS at 13:09

## 2019-11-04 RX ADMIN — SODIUM CHLORIDE 75 MILLILITER(S): 9 INJECTION, SOLUTION INTRAVENOUS at 15:31

## 2019-11-04 RX ADMIN — SIMVASTATIN 20 MILLIGRAM(S): 20 TABLET, FILM COATED ORAL at 21:50

## 2019-11-04 RX ADMIN — Medication 81 MILLIGRAM(S): at 11:31

## 2019-11-04 RX ADMIN — CARVEDILOL PHOSPHATE 12.5 MILLIGRAM(S): 80 CAPSULE, EXTENDED RELEASE ORAL at 17:47

## 2019-11-04 NOTE — CONSULT NOTE ADULT - SUBJECTIVE AND OBJECTIVE BOX
HPI:  87M h/o CAD s/p stents (2001, 2016), HTN, HLD, kidney cancer s/p R lap partial nephrectomy and RPLND 11/1. Pt currently feeling well, denies any pain at this time. He is passing gas and had a BM this morning. No n/v/d. Pt with h/o CAD and HTN that are well controlled on ASA/Plavix/Coreg/Ramipril/statin. No chest pain. Medicine consulted for co-management of anemia and HTN.     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 No tinnitus, vertigo; No sinus or throat pain  NECK: No pain or stiffness  BREASTS: No pain, masses, or nipple discharge  RESPIRATORY: No cough, wheezing, chills or hemoptysis; No shortness of breath  CARDIOVASCULAR: No chest pain, palpitations, dizziness, or leg swelling  GASTROINTESTINAL: No abdominal or epigastric pain. No nausea, vomiting, or hematemesis; No diarrhea or constipation. No melena or hematochezia.  GENITOURINARY: No dysuria, frequency, hematuria, or incontinence  NEUROLOGICAL: No headaches, memory loss, loss of strength, numbness, or tremors  SKIN: No itching, burning, rashes, or lesions   LYMPH NODES: No enlarged glands  ENDOCRINE: No heat or cold intolerance; No hair loss  MUSCULOSKELETAL: No joint pain or swelling; No muscle, back, or extremity pain  PSYCHIATRIC: No depression, anxiety, mood swings, or difficulty sleeping  HEME/LYMPH: No easy bruising, or bleeding gums  ALLERY AND IMMUNOLOGIC: No hives or eczema    Allergies    No Known Allergies      Social History:   Occasional alcohol   Former smoker     FAMILY HISTORY:  No family history of renal CA    HOME MEDICATIONS:  omeprazole 20 mg oral delayed release capsule: Last Dose Taken:  , 1 cap(s) orally once a day  ramipril 5 mg oral tablet: 1 tab(s) orally once a day  simvastatin 20 mg oral tablet: 1 tab(s) orally once a day  clopidogrel 75 mg oral tablet: Last Dose Taken:  , 1 tab(s) orally once a day  carvedilol 12.5 mg oral tablet: Last Dose Taken:  , 1 tab(s) orally 2 times a day  aspirin 81 mg oral tablet: Last Dose Taken:  , 1 tab(s) orally once a day  CoQ10: Last Dose Taken:  , 1 tab(s) orally once a day    Vital Signs Last 24 Hrs  T(C): 37.3 (04 Nov 2019 11:27), Max: 37.5 (03 Nov 2019 20:59)  T(F): 99.2 (04 Nov 2019 11:27), Max: 99.5 (03 Nov 2019 20:59)  HR: 87 (04 Nov 2019 11:27) (87 - 99)  BP: 97/43 (04 Nov 2019 11:27) (92/38 - 126/51)  BP(mean): --  RR: 16 (04 Nov 2019 11:27) (15 - 16)  SpO2: 98% (04 Nov 2019 11:27) (97% - 100%)  CAPILLARY BLOOD GLUCOSE        I&O's Summary    03 Nov 2019 07:01  -  04 Nov 2019 07:00  --------------------------------------------------------  IN: 925 mL / OUT: 1235 mL / NET: -310 mL    04 Nov 2019 07:01  -  04 Nov 2019 11:28  --------------------------------------------------------  IN: 0 mL / OUT: 375 mL / NET: -375 mL        PHYSICAL EXAM:  GENERAL: NAD  HEAD:  Atraumatic, Normocephalic  EYES: EOMI, PERRLA, conjunctiva and sclera clear  NECK: Supple, No JVD  CHEST/LUNG: Clear to auscultation bilaterally; No wheeze  HEART: Regular rate and rhythm; No murmurs, rubs, or gallops  ABDOMEN: Soft, Nontender, Nondistended; Bowel sounds present. Wounds c/d/i   EXTREMITIES:  2+ Peripheral Pulses, No clubbing, cyanosis, or edema  PSYCH: AAOx3  NEUROLOGY: non-focal  SKIN: No rashes or lesions    LABS:                        7.1    10.88 )-----------( 166      ( 04 Nov 2019 05:14 )             22.7     11-04    136  |  103  |  34<H>  ----------------------------<  107<H>  4.2   |  24  |  2.03<H>    Ca    8.1<L>      04 Nov 2019 05:14                RADIOLOGY & ADDITIONAL TESTS:    Imaging Personally Reviewed:    Consultant(s) Notes Reviewed:      Care Discussed with Consultants/Other Providers: HPI:  87M h/o CAD s/p stents (2001, 2016), HTN, HLD, kidney cancer s/p R lap partial nephrectomy and RPLND 11/1. Pt currently feeling well, denies any pain at this time. He is passing gas and had a BM this morning. No n/v/d. Pt with h/o CAD and HTN that are well controlled on ASA/Plavix/Coreg/Ramipril/statin. No chest pain. Medicine consulted for co-management of anemia and HTN.     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 No tinnitus, vertigo; No sinus or throat pain  NECK: No pain or stiffness  BREASTS: No pain, masses, or nipple discharge  RESPIRATORY: No cough, wheezing, chills or hemoptysis; No shortness of breath  CARDIOVASCULAR: No chest pain, palpitations, dizziness, or leg swelling  GASTROINTESTINAL: No abdominal or epigastric pain. No nausea, vomiting, or hematemesis; No diarrhea or constipation. No melena or hematochezia.  GENITOURINARY: No dysuria, frequency, hematuria, or incontinence  NEUROLOGICAL: No headaches, memory loss, loss of strength, numbness, or tremors  SKIN: No itching, burning, rashes, or lesions   LYMPH NODES: No enlarged glands  ENDOCRINE: No heat or cold intolerance; No hair loss  MUSCULOSKELETAL: No joint pain or swelling; No muscle, back, or extremity pain  PSYCHIATRIC: No depression, anxiety, mood swings, or difficulty sleeping  HEME/LYMPH: No easy bruising, or bleeding gums  ALLERY AND IMMUNOLOGIC: No hives or eczema    Allergies    No Known Allergies      Social History:   Occasional alcohol   Former smoker     FAMILY HISTORY:  No family history of renal CA    HOME MEDICATIONS:  omeprazole 20 mg oral delayed release capsule: Last Dose Taken:  , 1 cap(s) orally once a day  ramipril 5 mg oral tablet: 1 tab(s) orally once a day  simvastatin 20 mg oral tablet: 1 tab(s) orally once a day  clopidogrel 75 mg oral tablet: Last Dose Taken:  , 1 tab(s) orally once a day  carvedilol 12.5 mg oral tablet: Last Dose Taken:  , 1 tab(s) orally 2 times a day  aspirin 81 mg oral tablet: Last Dose Taken:  , 1 tab(s) orally once a day  CoQ10: Last Dose Taken:  , 1 tab(s) orally once a day    Vital Signs Last 24 Hrs  T(C): 37.3 (04 Nov 2019 11:27), Max: 37.5 (03 Nov 2019 20:59)  T(F): 99.2 (04 Nov 2019 11:27), Max: 99.5 (03 Nov 2019 20:59)  HR: 87 (04 Nov 2019 11:27) (87 - 99)  BP: 97/43 (04 Nov 2019 11:27) (92/38 - 126/51)  BP(mean): --  RR: 16 (04 Nov 2019 11:27) (15 - 16)  SpO2: 98% (04 Nov 2019 11:27) (97% - 100%)  CAPILLARY BLOOD GLUCOSE        I&O's Summary    03 Nov 2019 07:01  -  04 Nov 2019 07:00  --------------------------------------------------------  IN: 925 mL / OUT: 1235 mL / NET: -310 mL    04 Nov 2019 07:01  -  04 Nov 2019 11:28  --------------------------------------------------------  IN: 0 mL / OUT: 375 mL / NET: -375 mL        PHYSICAL EXAM:  GENERAL: NAD  HEAD:  Atraumatic, Normocephalic  EYES: EOMI, PERRLA, conjunctiva and sclera clear  NECK: Supple, No JVD  CHEST/LUNG: Clear to auscultation bilaterally; No wheeze  HEART: Regular rate and rhythm; No murmurs, rubs, or gallops  ABDOMEN: Soft, Nontender, Nondistended; Bowel sounds present. Wounds c/d/i   EXTREMITIES:  2+ Peripheral Pulses, No clubbing, cyanosis, or edema  PSYCH: AAOx3  NEUROLOGY: non-focal  SKIN: No rashes or lesions    LABS:                        7.1    10.88 )-----------( 166      ( 04 Nov 2019 05:14 )             22.7     11-04    136  |  103  |  34<H>  ----------------------------<  107<H>  4.2   |  24  |  2.03<H>    Ca    8.1<L>      04 Nov 2019 05:14                RADIOLOGY & ADDITIONAL TESTS:    Imaging Personally Reviewed:  EKG tracing reviewed and interpreted by me: ZAK, CHAVA    Consultant(s) Notes Reviewed:      Care Discussed with Consultants/Other Providers: Spoke w/ RODNEY DUBOIS (Korin), will transfuse 1unit PRBC

## 2019-11-04 NOTE — PROGRESS NOTE ADULT - SUBJECTIVE AND OBJECTIVE BOX
POD #3  Afeb 118/50 99 98%RA    Pt has no c/o  Abd- soft NT ND; + flatus      wounds C&D  Void 675  JAMES 8  Crit stable  Aisha reg diet

## 2019-11-04 NOTE — CONSULT NOTE ADULT - ASSESSMENT
87M h/o CAD s/p stents (2001, 2016), HTN, HLD, kidney cancer s/p R lap partial nephrectomy and RPLND 11/1.

## 2019-11-05 ENCOUNTER — MESSAGE (OUTPATIENT)
Age: 84
End: 2019-11-05

## 2019-11-05 ENCOUNTER — TRANSCRIPTION ENCOUNTER (OUTPATIENT)
Age: 84
End: 2019-11-05

## 2019-11-05 VITALS
DIASTOLIC BLOOD PRESSURE: 61 MMHG | SYSTOLIC BLOOD PRESSURE: 142 MMHG | OXYGEN SATURATION: 99 % | RESPIRATION RATE: 16 BRPM | HEART RATE: 89 BPM | TEMPERATURE: 98 F

## 2019-11-05 PROCEDURE — 99238 HOSP IP/OBS DSCHRG MGMT 30/<: CPT

## 2019-11-05 RX ORDER — CLOPIDOGREL BISULFATE 75 MG/1
1 TABLET, FILM COATED ORAL
Qty: 0 | Refills: 0 | DISCHARGE

## 2019-11-05 RX ORDER — ACETAMINOPHEN 500 MG
2 TABLET ORAL
Qty: 0 | Refills: 0 | DISCHARGE
Start: 2019-11-05

## 2019-11-05 RX ORDER — INFLUENZA VIRUS VACCINE 15; 15; 15; 15 UG/.5ML; UG/.5ML; UG/.5ML; UG/.5ML
0.5 SUSPENSION INTRAMUSCULAR ONCE
Refills: 0 | Status: COMPLETED | OUTPATIENT
Start: 2019-11-05 | End: 2019-11-05

## 2019-11-05 RX ADMIN — CARVEDILOL PHOSPHATE 12.5 MILLIGRAM(S): 80 CAPSULE, EXTENDED RELEASE ORAL at 06:53

## 2019-11-05 RX ADMIN — INFLUENZA VIRUS VACCINE 0.5 MILLILITER(S): 15; 15; 15; 15 SUSPENSION INTRAMUSCULAR at 09:53

## 2019-11-05 RX ADMIN — HEPARIN SODIUM 5000 UNIT(S): 5000 INJECTION INTRAVENOUS; SUBCUTANEOUS at 06:53

## 2019-11-05 RX ADMIN — PANTOPRAZOLE SODIUM 40 MILLIGRAM(S): 20 TABLET, DELAYED RELEASE ORAL at 06:53

## 2019-11-05 RX ADMIN — LISINOPRIL 20 MILLIGRAM(S): 2.5 TABLET ORAL at 06:53

## 2019-11-05 NOTE — DISCHARGE NOTE NURSING/CASE MANAGEMENT/SOCIAL WORK - NSDPDISTO_GEN_ALL_CORE
Home/stable, abdominal lap sites in place clean dry and intact, right side abd dsd at drain site inttact, tolerating diet, oob without difficulty, voiding well.

## 2019-11-05 NOTE — DISCHARGE NOTE PROVIDER - HOSPITAL COURSE
Pt had R. lap partial neph 4 days ago; did well; ambulating; voiding well;  passed gas and curtis reg diet; yesterday his crit dropped to 22.7 and 2 units packed cells    were given; crit now 27.9; JAMES removed; home today; f/u in 2 weeks

## 2019-11-05 NOTE — DISCHARGE NOTE NURSING/CASE MANAGEMENT/SOCIAL WORK - NSDCPNINST_GEN_ALL_CORE
Notify Dr Navarro if you experience any increase in pain  not relieved with pain medication, any nausea vomiting any redness drainage or swelling around  incision or any fever >100.5.  Drink plenty of fluids.  No heavy lifting or straining.  Use over the counter stool softeners to assist with constipation which can be a side effect of your pain medication.

## 2019-11-05 NOTE — PROGRESS NOTE ADULT - SUBJECTIVE AND OBJECTIVE BOX
POD #4  Afeb 144/60 100 99%RA    Pt has no c/o  Abd- soft NT ND; + flatus     wounds C&D  Voiding well  JAMES 10  Aisha reg diet  OOB  Recieved 1UPC yesterday; crit responded

## 2019-11-05 NOTE — PROGRESS NOTE ADULT - ASSESSMENT
87 year old man POD#4 s/p right lap partial nephrectomy; recieved 1 UPC yesterday for crit 22.7    Plan:  - check labs  - OOB  - f/u med  - home today

## 2019-11-05 NOTE — DISCHARGE NOTE NURSING/CASE MANAGEMENT/SOCIAL WORK - NSDCPECAREGIVERED_GEN_ALL_CORE
carenotes on lap partial nephrectomy, carenotes on d/c medications, managing pain at home handout carenotes on lap partial nephrectomy, managing pain at home handout, carenotes on d/c medications, urology medication side effects pamphlet carenotes onlap partial nephrectomy, side effects handout, d/c medication carenotes, managing pain at home handout

## 2019-11-05 NOTE — DISCHARGE NOTE NURSING/CASE MANAGEMENT/SOCIAL WORK - PATIENT PORTAL LINK FT
You can access the FollowMyHealth Patient Portal offered by [facility] by registering at the following website: [email or web address]. By joining [facility]’s FollowMyHealth portal, you will also be able to view your health information using other applications (apps) compatible with our system.

## 2019-11-05 NOTE — DISCHARGE NOTE PROVIDER - NSDCCPCAREPLAN_GEN_ALL_CORE_FT
PRINCIPAL DISCHARGE DIAGNOSIS  Diagnosis: Malignant neoplasm of right kidney  Assessment and Plan of Treatment: Drink plenty of fluids.  No heavy lifting (greater than 10 pounds) or straining for 4 to 6 weeks.  You may shower, just pat white strips dry, they will fall off in a few weeks. Change dressing at drain site daily or as needed until dry.   Do not take plavix until you see your PCP and he determines if you need it or not.  See Dr. Navarro in 2 weeks; Call office  to schedule a follow up appointment.  Call the office if you have fever greater than 101, difficulty urinating, pain not relieved with pain medication, nausea/vomiting..

## 2019-11-05 NOTE — DISCHARGE NOTE PROVIDER - NSDCMRMEDTOKEN_GEN_ALL_CORE_FT
acetaminophen 325 mg oral tablet: 2 tab(s) orally every 6 hours, As needed, Temp greater or equal to 38C (100.4F), Mild Pain (1 - 3)  aspirin 81 mg oral tablet: 1 tab(s) orally once a day  carvedilol 12.5 mg oral tablet: 1 tab(s) orally 2 times a day  CoQ10: 1 tab(s) orally once a day LD 10/25/19  omeprazole 20 mg oral delayed release capsule: 1 cap(s) orally once a day  ramipril 5 mg oral tablet: 1 tab(s) orally once a day  simvastatin 20 mg oral tablet: 1 tab(s) orally once a day

## 2019-11-05 NOTE — DISCHARGE NOTE PROVIDER - CARE PROVIDER_API CALL
Amando Navarro)  Urology  [address] [place] [address], [place] [address]  Phone: [phone]  Fax: [phone]  Follow Up Time:

## 2019-11-05 NOTE — DISCHARGE NOTE NURSING/CASE MANAGEMENT/SOCIAL WORK - NSDCPNDISPN_GEN_ALL_CORE
Activities of daily living, including home environment that might     exacerbate pain or reduce effectiveness of the pain management plan of care as well as strategies to address these issues/Education provided on the pain management plan of care/Side effects of pain management treatment Opioids not applicable/not prescribed/Side effects of pain management treatment/Education provided on the pain management plan of care/Activities of daily living, including home environment that might     exacerbate pain or reduce effectiveness of the pain management plan of care as well as strategies to address these issues

## 2019-11-07 LAB
APPEARANCE: ABNORMAL
BACTERIA: NEGATIVE
BILIRUBIN URINE: NEGATIVE
BLOOD URINE: ABNORMAL
COLOR: YELLOW
GLUCOSE QUALITATIVE U: NEGATIVE
HYALINE CASTS: 1 /LPF
KETONES URINE: NEGATIVE
LEUKOCYTE ESTERASE URINE: NEGATIVE
MICROSCOPIC-UA: NORMAL
NITRITE URINE: NEGATIVE
PH URINE: 6
PROTEIN URINE: ABNORMAL
RED BLOOD CELLS URINE: 112 /HPF
SPECIFIC GRAVITY URINE: 1.02
SQUAMOUS EPITHELIAL CELLS: 1 /HPF
UROBILINOGEN URINE: NORMAL
WHITE BLOOD CELLS URINE: 4 /HPF

## 2019-11-08 LAB
BACTERIA UR CULT: NORMAL
SURGICAL PATHOLOGY STUDY: SIGNIFICANT CHANGE UP

## 2019-11-11 ENCOUNTER — EMERGENCY (EMERGENCY)
Facility: HOSPITAL | Age: 84
LOS: 0 days | Discharge: ANOTHER HOSPITAL | End: 2019-11-12
Attending: STUDENT IN AN ORGANIZED HEALTH CARE EDUCATION/TRAINING PROGRAM
Payer: MEDICARE

## 2019-11-11 VITALS
HEIGHT: 67 IN | DIASTOLIC BLOOD PRESSURE: 55 MMHG | OXYGEN SATURATION: 98 % | TEMPERATURE: 99 F | SYSTOLIC BLOOD PRESSURE: 120 MMHG | HEART RATE: 96 BPM | WEIGHT: 162.92 LBS | RESPIRATION RATE: 18 BRPM

## 2019-11-11 DIAGNOSIS — Z96.649 PRESENCE OF UNSPECIFIED ARTIFICIAL HIP JOINT: Chronic | ICD-10-CM

## 2019-11-11 DIAGNOSIS — Z95.5 PRESENCE OF CORONARY ANGIOPLASTY IMPLANT AND GRAFT: Chronic | ICD-10-CM

## 2019-11-11 DIAGNOSIS — R53.1 WEAKNESS: ICD-10-CM

## 2019-11-11 DIAGNOSIS — Z85.528 PERSONAL HISTORY OF OTHER MALIGNANT NEOPLASM OF KIDNEY: ICD-10-CM

## 2019-11-11 DIAGNOSIS — R30.0 DYSURIA: ICD-10-CM

## 2019-11-11 DIAGNOSIS — R31.9 HEMATURIA, UNSPECIFIED: ICD-10-CM

## 2019-11-11 DIAGNOSIS — I25.10 ATHEROSCLEROTIC HEART DISEASE OF NATIVE CORONARY ARTERY WITHOUT ANGINA PECTORIS: ICD-10-CM

## 2019-11-11 DIAGNOSIS — Z79.82 LONG TERM (CURRENT) USE OF ASPIRIN: ICD-10-CM

## 2019-11-11 DIAGNOSIS — E78.5 HYPERLIPIDEMIA, UNSPECIFIED: ICD-10-CM

## 2019-11-11 DIAGNOSIS — D64.9 ANEMIA, UNSPECIFIED: ICD-10-CM

## 2019-11-11 DIAGNOSIS — I10 ESSENTIAL (PRIMARY) HYPERTENSION: ICD-10-CM

## 2019-11-11 PROCEDURE — 99291 CRITICAL CARE FIRST HOUR: CPT

## 2019-11-11 NOTE — ED ADULT TRIAGE NOTE - CHIEF COMPLAINT QUOTE
BIBA,  pt c/o difficulty urinating and urine in blood today,  pt states he had kidney surgery on 11/1/19 at [facility].   pt c/o incontinent.

## 2019-11-12 ENCOUNTER — INPATIENT (INPATIENT)
Facility: HOSPITAL | Age: 84
LOS: 6 days | Discharge: HOME HEALTH | End: 2019-11-19
Attending: UROLOGY | Admitting: UROLOGY
Payer: MEDICARE

## 2019-11-12 VITALS
SYSTOLIC BLOOD PRESSURE: 135 MMHG | TEMPERATURE: 98 F | HEART RATE: 78 BPM | OXYGEN SATURATION: 98 % | DIASTOLIC BLOOD PRESSURE: 58 MMHG | RESPIRATION RATE: 20 BRPM

## 2019-11-12 VITALS
DIASTOLIC BLOOD PRESSURE: 60 MMHG | OXYGEN SATURATION: 98 % | TEMPERATURE: 98 F | SYSTOLIC BLOOD PRESSURE: 139 MMHG | HEART RATE: 77 BPM | RESPIRATION RATE: 22 BRPM

## 2019-11-12 DIAGNOSIS — Z96.649 PRESENCE OF UNSPECIFIED ARTIFICIAL HIP JOINT: Chronic | ICD-10-CM

## 2019-11-12 DIAGNOSIS — R31.0 GROSS HEMATURIA: ICD-10-CM

## 2019-11-12 DIAGNOSIS — D62 ACUTE POSTHEMORRHAGIC ANEMIA: ICD-10-CM

## 2019-11-12 DIAGNOSIS — Z95.5 PRESENCE OF CORONARY ANGIOPLASTY IMPLANT AND GRAFT: Chronic | ICD-10-CM

## 2019-11-12 LAB
ABO RH CONFIRMATION: SIGNIFICANT CHANGE UP
ALBUMIN SERPL ELPH-MCNC: 2.4 G/DL — LOW (ref 3.3–5)
ALBUMIN SERPL ELPH-MCNC: 2.6 G/DL — LOW (ref 3.3–5)
ALP SERPL-CCNC: 54 U/L — SIGNIFICANT CHANGE UP (ref 40–120)
ALP SERPL-CCNC: 61 U/L — SIGNIFICANT CHANGE UP (ref 40–120)
ALT FLD-CCNC: 20 U/L — SIGNIFICANT CHANGE UP (ref 4–41)
ALT FLD-CCNC: 37 U/L — SIGNIFICANT CHANGE UP (ref 12–78)
ANION GAP SERPL CALC-SCNC: 7 MMOL/L — SIGNIFICANT CHANGE UP (ref 5–17)
ANION GAP SERPL CALC-SCNC: 8 MMO/L — SIGNIFICANT CHANGE UP (ref 7–14)
ANION GAP SERPL CALC-SCNC: 9 MMO/L — SIGNIFICANT CHANGE UP (ref 7–14)
ANISOCYTOSIS BLD QL: SLIGHT — SIGNIFICANT CHANGE UP
APPEARANCE UR: ABNORMAL
APTT BLD: 24.3 SEC — LOW (ref 27.5–36.3)
APTT BLD: 28.8 SEC — SIGNIFICANT CHANGE UP (ref 27.5–36.3)
APTT BLD: 28.9 SEC — SIGNIFICANT CHANGE UP (ref 27.5–36.3)
AST SERPL-CCNC: 16 U/L — SIGNIFICANT CHANGE UP (ref 4–40)
AST SERPL-CCNC: 30 U/L — SIGNIFICANT CHANGE UP (ref 15–37)
BACTERIA # UR AUTO: ABNORMAL
BASE EXCESS BLDA CALC-SCNC: -1.6 MMOL/L — SIGNIFICANT CHANGE UP
BASE EXCESS BLDA CALC-SCNC: -1.8 MMOL/L — SIGNIFICANT CHANGE UP
BASE EXCESS BLDA CALC-SCNC: -2.6 MMOL/L — SIGNIFICANT CHANGE UP
BASE EXCESS BLDA CALC-SCNC: -2.6 MMOL/L — SIGNIFICANT CHANGE UP
BASE EXCESS BLDA CALC-SCNC: -4.5 MMOL/L — SIGNIFICANT CHANGE UP
BASOPHILS # BLD AUTO: 0.01 K/UL — SIGNIFICANT CHANGE UP (ref 0–0.2)
BASOPHILS # BLD AUTO: 0.02 K/UL — SIGNIFICANT CHANGE UP (ref 0–0.2)
BASOPHILS NFR BLD AUTO: 0.1 % — SIGNIFICANT CHANGE UP (ref 0–2)
BASOPHILS NFR BLD AUTO: 0.2 % — SIGNIFICANT CHANGE UP (ref 0–2)
BILIRUB SERPL-MCNC: 0.3 MG/DL — SIGNIFICANT CHANGE UP (ref 0.2–1.2)
BILIRUB SERPL-MCNC: 0.4 MG/DL — SIGNIFICANT CHANGE UP (ref 0.2–1.2)
BILIRUB UR-MCNC: NEGATIVE — SIGNIFICANT CHANGE UP
BLD GP AB SCN SERPL QL: NEGATIVE — SIGNIFICANT CHANGE UP
BLD GP AB SCN SERPL QL: SIGNIFICANT CHANGE UP
BLOOD GAS ARTERIAL - FIO2: 40 — SIGNIFICANT CHANGE UP
BUN SERPL-MCNC: 27 MG/DL — HIGH (ref 7–23)
BUN SERPL-MCNC: 27 MG/DL — HIGH (ref 7–23)
BUN SERPL-MCNC: 35 MG/DL — HIGH (ref 7–23)
CA-I BLDA-SCNC: 1.07 MMOL/L — LOW (ref 1.15–1.29)
CA-I BLDA-SCNC: 1.16 MMOL/L — SIGNIFICANT CHANGE UP (ref 1.15–1.29)
CA-I BLDA-SCNC: 1.21 MMOL/L — SIGNIFICANT CHANGE UP (ref 1.15–1.29)
CA-I BLDA-SCNC: 1.3 MMOL/L — HIGH (ref 1.15–1.29)
CALCIUM SERPL-MCNC: 7.6 MG/DL — LOW (ref 8.4–10.5)
CALCIUM SERPL-MCNC: 8.2 MG/DL — LOW (ref 8.5–10.1)
CALCIUM SERPL-MCNC: 8.3 MG/DL — LOW (ref 8.4–10.5)
CHLORIDE SERPL-SCNC: 108 MMOL/L — HIGH (ref 98–107)
CHLORIDE SERPL-SCNC: 109 MMOL/L — HIGH (ref 98–107)
CHLORIDE SERPL-SCNC: 112 MMOL/L — HIGH (ref 96–108)
CO2 SERPL-SCNC: 21 MMOL/L — LOW (ref 22–31)
CO2 SERPL-SCNC: 22 MMOL/L — SIGNIFICANT CHANGE UP (ref 22–31)
CO2 SERPL-SCNC: 23 MMOL/L — SIGNIFICANT CHANGE UP (ref 22–31)
COLOR SPEC: ABNORMAL
CREAT SERPL-MCNC: 1.58 MG/DL — HIGH (ref 0.5–1.3)
CREAT SERPL-MCNC: 1.61 MG/DL — HIGH (ref 0.5–1.3)
CREAT SERPL-MCNC: 1.8 MG/DL — HIGH (ref 0.5–1.3)
DIFF PNL FLD: ABNORMAL
EOSINOPHIL # BLD AUTO: 0.23 K/UL — SIGNIFICANT CHANGE UP (ref 0–0.5)
EOSINOPHIL # BLD AUTO: 0.39 K/UL — SIGNIFICANT CHANGE UP (ref 0–0.5)
EOSINOPHIL NFR BLD AUTO: 1.9 % — SIGNIFICANT CHANGE UP (ref 0–6)
EOSINOPHIL NFR BLD AUTO: 3.4 % — SIGNIFICANT CHANGE UP (ref 0–6)
FIBRINOGEN PPP-MCNC: 522.9 MG/DL — HIGH (ref 350–510)
GLUCOSE BLDA-MCNC: 107 MG/DL — HIGH (ref 70–99)
GLUCOSE BLDA-MCNC: 107 MG/DL — HIGH (ref 70–99)
GLUCOSE BLDA-MCNC: 86 MG/DL — SIGNIFICANT CHANGE UP (ref 70–99)
GLUCOSE BLDA-MCNC: 97 MG/DL — SIGNIFICANT CHANGE UP (ref 70–99)
GLUCOSE BLDA-MCNC: 99 MG/DL — SIGNIFICANT CHANGE UP (ref 70–99)
GLUCOSE SERPL-MCNC: 112 MG/DL — HIGH (ref 70–99)
GLUCOSE SERPL-MCNC: 114 MG/DL — HIGH (ref 70–99)
GLUCOSE SERPL-MCNC: 91 MG/DL — SIGNIFICANT CHANGE UP (ref 70–99)
GLUCOSE UR QL: NEGATIVE MG/DL — SIGNIFICANT CHANGE UP
HCO3 BLDA-SCNC: 21 MMOL/L — LOW (ref 22–26)
HCO3 BLDA-SCNC: 22 MMOL/L — SIGNIFICANT CHANGE UP (ref 22–26)
HCO3 BLDA-SCNC: 22 MMOL/L — SIGNIFICANT CHANGE UP (ref 22–26)
HCO3 BLDA-SCNC: 23 MMOL/L — SIGNIFICANT CHANGE UP (ref 22–26)
HCO3 BLDA-SCNC: 23 MMOL/L — SIGNIFICANT CHANGE UP (ref 22–26)
HCT VFR BLD CALC: 17.8 % — CRITICAL LOW (ref 39–50)
HCT VFR BLD CALC: 20.6 % — CRITICAL LOW (ref 39–50)
HCT VFR BLD CALC: 28.6 % — LOW (ref 39–50)
HCT VFR BLD CALC: 34 % — LOW (ref 39–50)
HCT VFR BLDA CALC: 25.5 % — LOW (ref 39–51)
HCT VFR BLDA CALC: 26.3 % — LOW (ref 39–51)
HCT VFR BLDA CALC: 33 % — LOW (ref 39–51)
HCT VFR BLDA CALC: 33 % — LOW (ref 39–51)
HCT VFR BLDA CALC: 35.8 % — LOW (ref 39–51)
HGB BLD-MCNC: 11.7 G/DL — LOW (ref 13–17)
HGB BLD-MCNC: 5.8 G/DL — CRITICAL LOW (ref 13–17)
HGB BLD-MCNC: 6.5 G/DL — CRITICAL LOW (ref 13–17)
HGB BLD-MCNC: 9.3 G/DL — LOW (ref 13–17)
HGB BLDA-MCNC: 10.7 G/DL — LOW (ref 13–17)
HGB BLDA-MCNC: 10.7 G/DL — LOW (ref 13–17)
HGB BLDA-MCNC: 11.6 G/DL — LOW (ref 13–17)
HGB BLDA-MCNC: 8.2 G/DL — LOW (ref 13–17)
HGB BLDA-MCNC: 8.5 G/DL — LOW (ref 13–17)
HYPOCHROMIA BLD QL: SLIGHT — SIGNIFICANT CHANGE UP
IMM GRANULOCYTES NFR BLD AUTO: 0.4 % — SIGNIFICANT CHANGE UP (ref 0–1.5)
IMM GRANULOCYTES NFR BLD AUTO: 0.5 % — SIGNIFICANT CHANGE UP (ref 0–1.5)
INR BLD: 1.09 — SIGNIFICANT CHANGE UP (ref 0.88–1.17)
INR BLD: 1.1 — SIGNIFICANT CHANGE UP (ref 0.88–1.17)
INR BLD: 1.15 — SIGNIFICANT CHANGE UP (ref 0.88–1.17)
KETONES UR-MCNC: ABNORMAL
LACTATE BLDA-SCNC: 0.8 MMOL/L — SIGNIFICANT CHANGE UP (ref 0.5–2)
LACTATE BLDA-SCNC: 1 MMOL/L — SIGNIFICANT CHANGE UP (ref 0.5–2)
LACTATE BLDA-SCNC: 1 MMOL/L — SIGNIFICANT CHANGE UP (ref 0.5–2)
LACTATE SERPL-SCNC: 0.6 MMOL/L — LOW (ref 0.7–2)
LEUKOCYTE ESTERASE UR-ACNC: ABNORMAL
LIDOCAIN IGE QN: 265 U/L — SIGNIFICANT CHANGE UP (ref 73–393)
LYMPHOCYTES # BLD AUTO: 0.97 K/UL — LOW (ref 1–3.3)
LYMPHOCYTES # BLD AUTO: 1.26 K/UL — SIGNIFICANT CHANGE UP (ref 1–3.3)
LYMPHOCYTES # BLD AUTO: 10.7 % — LOW (ref 13–44)
LYMPHOCYTES # BLD AUTO: 8.5 % — LOW (ref 13–44)
MAGNESIUM SERPL-MCNC: 2.4 MG/DL — SIGNIFICANT CHANGE UP (ref 1.6–2.6)
MANUAL SMEAR VERIFICATION: YES — SIGNIFICANT CHANGE UP
MCHC RBC-ENTMCNC: 29.3 PG — SIGNIFICANT CHANGE UP (ref 27–34)
MCHC RBC-ENTMCNC: 29.8 PG — SIGNIFICANT CHANGE UP (ref 27–34)
MCHC RBC-ENTMCNC: 29.9 PG — SIGNIFICANT CHANGE UP (ref 27–34)
MCHC RBC-ENTMCNC: 30.2 PG — SIGNIFICANT CHANGE UP (ref 27–34)
MCHC RBC-ENTMCNC: 31.6 % — LOW (ref 32–36)
MCHC RBC-ENTMCNC: 32.5 % — SIGNIFICANT CHANGE UP (ref 32–36)
MCHC RBC-ENTMCNC: 32.6 GM/DL — SIGNIFICANT CHANGE UP (ref 32–36)
MCHC RBC-ENTMCNC: 34.4 % — SIGNIFICANT CHANGE UP (ref 32–36)
MCV RBC AUTO: 87.9 FL — SIGNIFICANT CHANGE UP (ref 80–100)
MCV RBC AUTO: 91.7 FL — SIGNIFICANT CHANGE UP (ref 80–100)
MCV RBC AUTO: 91.8 FL — SIGNIFICANT CHANGE UP (ref 80–100)
MCV RBC AUTO: 92.8 FL — SIGNIFICANT CHANGE UP (ref 80–100)
MICROCYTES BLD QL: SLIGHT — SIGNIFICANT CHANGE UP
MONOCYTES # BLD AUTO: 0.89 K/UL — SIGNIFICANT CHANGE UP (ref 0–0.9)
MONOCYTES # BLD AUTO: 0.94 K/UL — HIGH (ref 0–0.9)
MONOCYTES NFR BLD AUTO: 7.5 % — SIGNIFICANT CHANGE UP (ref 2–14)
MONOCYTES NFR BLD AUTO: 8.2 % — SIGNIFICANT CHANGE UP (ref 2–14)
NEUTROPHILS # BLD AUTO: 9.08 K/UL — HIGH (ref 1.8–7.4)
NEUTROPHILS # BLD AUTO: 9.35 K/UL — HIGH (ref 1.8–7.4)
NEUTROPHILS NFR BLD AUTO: 79.2 % — HIGH (ref 43–77)
NEUTROPHILS NFR BLD AUTO: 79.4 % — HIGH (ref 43–77)
NITRITE UR-MCNC: NEGATIVE — SIGNIFICANT CHANGE UP
NRBC # BLD: 0 /100 WBCS — SIGNIFICANT CHANGE UP (ref 0–0)
NRBC # FLD: 0 K/UL — SIGNIFICANT CHANGE UP (ref 0–0)
PCO2 BLDA: 33 MMHG — LOW (ref 35–48)
PCO2 BLDA: 36 MMHG — SIGNIFICANT CHANGE UP (ref 35–48)
PCO2 BLDA: 36 MMHG — SIGNIFICANT CHANGE UP (ref 35–48)
PCO2 BLDA: 46 MMHG — SIGNIFICANT CHANGE UP (ref 35–48)
PCO2 BLDA: 46 MMHG — SIGNIFICANT CHANGE UP (ref 35–48)
PH BLDA: 7.39 PH — SIGNIFICANT CHANGE UP (ref 7.35–7.45)
PH BLDA: 7.41 PH — SIGNIFICANT CHANGE UP (ref 7.35–7.45)
PH BLDA: 7.41 PH — SIGNIFICANT CHANGE UP (ref 7.35–7.45)
PH UR: 6.5 — SIGNIFICANT CHANGE UP (ref 5–8)
PHOSPHATE SERPL-MCNC: 4.3 MG/DL — SIGNIFICANT CHANGE UP (ref 2.5–4.5)
PLAT MORPH BLD: NORMAL — SIGNIFICANT CHANGE UP
PLATELET # BLD AUTO: 271 K/UL — SIGNIFICANT CHANGE UP (ref 150–400)
PLATELET # BLD AUTO: 281 K/UL — SIGNIFICANT CHANGE UP (ref 150–400)
PLATELET # BLD AUTO: 296 K/UL — SIGNIFICANT CHANGE UP (ref 150–400)
PLATELET # BLD AUTO: 365 K/UL — SIGNIFICANT CHANGE UP (ref 150–400)
PMV BLD: 9 FL — SIGNIFICANT CHANGE UP (ref 7–13)
PMV BLD: 9.1 FL — SIGNIFICANT CHANGE UP (ref 7–13)
PMV BLD: 9.2 FL — SIGNIFICANT CHANGE UP (ref 7–13)
PO2 BLDA: 253 MMHG — HIGH (ref 83–108)
PO2 BLDA: 264 MMHG — HIGH (ref 83–108)
PO2 BLDA: 70 MMHG — LOW (ref 83–108)
POTASSIUM BLDA-SCNC: 4 MMOL/L — SIGNIFICANT CHANGE UP (ref 3.4–4.5)
POTASSIUM BLDA-SCNC: 4.3 MMOL/L — SIGNIFICANT CHANGE UP (ref 3.4–4.5)
POTASSIUM BLDA-SCNC: 4.5 MMOL/L — SIGNIFICANT CHANGE UP (ref 3.4–4.5)
POTASSIUM BLDA-SCNC: 4.7 MMOL/L — HIGH (ref 3.4–4.5)
POTASSIUM BLDA-SCNC: 4.7 MMOL/L — HIGH (ref 3.4–4.5)
POTASSIUM SERPL-MCNC: 4.3 MMOL/L — SIGNIFICANT CHANGE UP (ref 3.5–5.3)
POTASSIUM SERPL-MCNC: 4.6 MMOL/L — SIGNIFICANT CHANGE UP (ref 3.5–5.3)
POTASSIUM SERPL-MCNC: 4.8 MMOL/L — SIGNIFICANT CHANGE UP (ref 3.5–5.3)
POTASSIUM SERPL-SCNC: 4.3 MMOL/L — SIGNIFICANT CHANGE UP (ref 3.5–5.3)
POTASSIUM SERPL-SCNC: 4.6 MMOL/L — SIGNIFICANT CHANGE UP (ref 3.5–5.3)
POTASSIUM SERPL-SCNC: 4.8 MMOL/L — SIGNIFICANT CHANGE UP (ref 3.5–5.3)
PROT SERPL-MCNC: 4.7 G/DL — LOW (ref 6–8.3)
PROT SERPL-MCNC: 5.4 GM/DL — LOW (ref 6–8.3)
PROT UR-MCNC: 500 MG/DL
PROTHROM AB SERPL-ACNC: 12.1 SEC — SIGNIFICANT CHANGE UP (ref 9.8–13.1)
PROTHROM AB SERPL-ACNC: 12.6 SEC — SIGNIFICANT CHANGE UP (ref 9.8–13.1)
PROTHROM AB SERPL-ACNC: 12.8 SEC — SIGNIFICANT CHANGE UP (ref 9.8–13.1)
RBC # BLD: 1.94 M/UL — LOW (ref 4.2–5.8)
RBC # BLD: 2.22 M/UL — LOW (ref 4.2–5.8)
RBC # BLD: 3.12 M/UL — LOW (ref 4.2–5.8)
RBC # BLD: 3.87 M/UL — LOW (ref 4.2–5.8)
RBC # FLD: 13.8 % — SIGNIFICANT CHANGE UP (ref 10.3–14.5)
RBC # FLD: 14.2 % — SIGNIFICANT CHANGE UP (ref 10.3–14.5)
RBC # FLD: 14.7 % — HIGH (ref 10.3–14.5)
RBC # FLD: 14.7 % — HIGH (ref 10.3–14.5)
RBC BLD AUTO: ABNORMAL
RBC CASTS # UR COMP ASSIST: >50 /HPF (ref 0–4)
RH IG SCN BLD-IMP: POSITIVE — SIGNIFICANT CHANGE UP
SAO2 % BLDA: 94.7 % — LOW (ref 95–99)
SAO2 % BLDA: 99.4 % — HIGH (ref 95–99)
SAO2 % BLDA: 99.4 % — HIGH (ref 95–99)
SAO2 % BLDA: 99.7 % — HIGH (ref 95–99)
SAO2 % BLDA: 99.7 % — HIGH (ref 95–99)
SODIUM BLDA-SCNC: 136 MMOL/L — SIGNIFICANT CHANGE UP (ref 136–146)
SODIUM BLDA-SCNC: 136 MMOL/L — SIGNIFICANT CHANGE UP (ref 136–146)
SODIUM BLDA-SCNC: 137 MMOL/L — SIGNIFICANT CHANGE UP (ref 136–146)
SODIUM BLDA-SCNC: 138 MMOL/L — SIGNIFICANT CHANGE UP (ref 136–146)
SODIUM BLDA-SCNC: 138 MMOL/L — SIGNIFICANT CHANGE UP (ref 136–146)
SODIUM SERPL-SCNC: 138 MMOL/L — SIGNIFICANT CHANGE UP (ref 135–145)
SODIUM SERPL-SCNC: 139 MMOL/L — SIGNIFICANT CHANGE UP (ref 135–145)
SODIUM SERPL-SCNC: 142 MMOL/L — SIGNIFICANT CHANGE UP (ref 135–145)
SP GR SPEC: 1.01 — SIGNIFICANT CHANGE UP (ref 1.01–1.02)
THROMBIN TIME: 26.3 SEC — HIGH (ref 16–25)
TROPONIN I SERPL-MCNC: <.015 NG/ML — SIGNIFICANT CHANGE UP (ref 0.01–0.04)
UROBILINOGEN FLD QL: NEGATIVE MG/DL — SIGNIFICANT CHANGE UP
WBC # BLD: 11.44 K/UL — HIGH (ref 3.8–10.5)
WBC # BLD: 11.59 K/UL — HIGH (ref 3.8–10.5)
WBC # BLD: 11.81 K/UL — HIGH (ref 3.8–10.5)
WBC # BLD: 9.85 K/UL — SIGNIFICANT CHANGE UP (ref 3.8–10.5)
WBC # FLD AUTO: 11.44 K/UL — HIGH (ref 3.8–10.5)
WBC # FLD AUTO: 11.59 K/UL — HIGH (ref 3.8–10.5)
WBC # FLD AUTO: 11.81 K/UL — HIGH (ref 3.8–10.5)
WBC # FLD AUTO: 9.85 K/UL — SIGNIFICANT CHANGE UP (ref 3.8–10.5)
WBC UR QL: >50

## 2019-11-12 PROCEDURE — 93010 ELECTROCARDIOGRAM REPORT: CPT

## 2019-11-12 PROCEDURE — 36247 INS CATH ABD/L-EXT ART 3RD: CPT

## 2019-11-12 PROCEDURE — 36248 INS CATH ABD/L-EXT ART ADDL: CPT

## 2019-11-12 PROCEDURE — 51702 INSERT TEMP BLADDER CATH: CPT

## 2019-11-12 PROCEDURE — 99223 1ST HOSP IP/OBS HIGH 75: CPT

## 2019-11-12 PROCEDURE — 74177 CT ABD & PELVIS W/CONTRAST: CPT | Mod: 26

## 2019-11-12 PROCEDURE — 76937 US GUIDE VASCULAR ACCESS: CPT | Mod: 26

## 2019-11-12 PROCEDURE — 99222 1ST HOSP IP/OBS MODERATE 55: CPT | Mod: 25

## 2019-11-12 PROCEDURE — 37242 VASC EMBOLIZE/OCCLUDE ARTERY: CPT

## 2019-11-12 RX ORDER — LABETALOL HCL 100 MG
10 TABLET ORAL ONCE
Refills: 0 | Status: COMPLETED | OUTPATIENT
Start: 2019-11-12 | End: 2019-11-12

## 2019-11-12 RX ORDER — SODIUM CHLORIDE 9 MG/ML
1000 INJECTION, SOLUTION INTRAVENOUS
Refills: 0 | Status: DISCONTINUED | OUTPATIENT
Start: 2019-11-12 | End: 2019-11-13

## 2019-11-12 RX ORDER — SIMVASTATIN 20 MG/1
20 TABLET, FILM COATED ORAL AT BEDTIME
Refills: 0 | Status: DISCONTINUED | OUTPATIENT
Start: 2019-11-12 | End: 2019-11-19

## 2019-11-12 RX ORDER — LISINOPRIL 2.5 MG/1
20 TABLET ORAL DAILY
Refills: 0 | Status: DISCONTINUED | OUTPATIENT
Start: 2019-11-12 | End: 2019-11-12

## 2019-11-12 RX ORDER — LISINOPRIL 2.5 MG/1
20 TABLET ORAL DAILY
Refills: 0 | Status: DISCONTINUED | OUTPATIENT
Start: 2019-11-12 | End: 2019-11-14

## 2019-11-12 RX ORDER — ACETAMINOPHEN 500 MG
1000 TABLET ORAL ONCE
Refills: 0 | Status: DISCONTINUED | OUTPATIENT
Start: 2019-11-12 | End: 2019-11-13

## 2019-11-12 RX ORDER — CARVEDILOL PHOSPHATE 80 MG/1
12.5 CAPSULE, EXTENDED RELEASE ORAL
Refills: 0 | Status: DISCONTINUED | OUTPATIENT
Start: 2019-11-12 | End: 2019-11-12

## 2019-11-12 RX ORDER — ONDANSETRON 8 MG/1
4 TABLET, FILM COATED ORAL ONCE
Refills: 0 | Status: DISCONTINUED | OUTPATIENT
Start: 2019-11-12 | End: 2019-11-13

## 2019-11-12 RX ORDER — HYDRALAZINE HCL 50 MG
10 TABLET ORAL ONCE
Refills: 0 | Status: COMPLETED | OUTPATIENT
Start: 2019-11-12 | End: 2019-11-12

## 2019-11-12 RX ORDER — FENTANYL CITRATE 50 UG/ML
50 INJECTION INTRAVENOUS
Refills: 0 | Status: DISCONTINUED | OUTPATIENT
Start: 2019-11-12 | End: 2019-11-13

## 2019-11-12 RX ORDER — LABETALOL HCL 100 MG
20 TABLET ORAL ONCE
Refills: 0 | Status: COMPLETED | OUTPATIENT
Start: 2019-11-12 | End: 2019-11-12

## 2019-11-12 RX ORDER — ASPIRIN/CALCIUM CARB/MAGNESIUM 324 MG
81 TABLET ORAL DAILY
Refills: 0 | Status: DISCONTINUED | OUTPATIENT
Start: 2019-11-12 | End: 2019-11-19

## 2019-11-12 RX ORDER — LABETALOL HCL 100 MG
10 TABLET ORAL ONCE
Refills: 0 | Status: DISCONTINUED | OUTPATIENT
Start: 2019-11-12 | End: 2019-11-12

## 2019-11-12 RX ORDER — FENTANYL CITRATE 50 UG/ML
25 INJECTION INTRAVENOUS
Refills: 0 | Status: DISCONTINUED | OUTPATIENT
Start: 2019-11-12 | End: 2019-11-13

## 2019-11-12 RX ORDER — CARVEDILOL PHOSPHATE 80 MG/1
12.5 CAPSULE, EXTENDED RELEASE ORAL EVERY 12 HOURS
Refills: 0 | Status: DISCONTINUED | OUTPATIENT
Start: 2019-11-12 | End: 2019-11-19

## 2019-11-12 RX ORDER — PANTOPRAZOLE SODIUM 20 MG/1
40 TABLET, DELAYED RELEASE ORAL
Refills: 0 | Status: DISCONTINUED | OUTPATIENT
Start: 2019-11-12 | End: 2019-11-19

## 2019-11-12 RX ORDER — SENNA PLUS 8.6 MG/1
2 TABLET ORAL AT BEDTIME
Refills: 0 | Status: DISCONTINUED | OUTPATIENT
Start: 2019-11-12 | End: 2019-11-16

## 2019-11-12 RX ORDER — HEPARIN SODIUM 5000 [USP'U]/ML
5000 INJECTION INTRAVENOUS; SUBCUTANEOUS EVERY 8 HOURS
Refills: 0 | Status: DISCONTINUED | OUTPATIENT
Start: 2019-11-13 | End: 2019-11-19

## 2019-11-12 RX ADMIN — PANTOPRAZOLE SODIUM 40 MILLIGRAM(S): 20 TABLET, DELAYED RELEASE ORAL at 10:27

## 2019-11-12 RX ADMIN — Medication 10 MILLIGRAM(S): at 16:25

## 2019-11-12 RX ADMIN — SODIUM CHLORIDE 125 MILLILITER(S): 9 INJECTION, SOLUTION INTRAVENOUS at 06:58

## 2019-11-12 RX ADMIN — Medication 20 MILLIGRAM(S): at 17:25

## 2019-11-12 RX ADMIN — FENTANYL CITRATE 25 MICROGRAM(S): 50 INJECTION INTRAVENOUS at 21:25

## 2019-11-12 RX ADMIN — LISINOPRIL 20 MILLIGRAM(S): 2.5 TABLET ORAL at 06:58

## 2019-11-12 RX ADMIN — LISINOPRIL 20 MILLIGRAM(S): 2.5 TABLET ORAL at 23:12

## 2019-11-12 RX ADMIN — SIMVASTATIN 20 MILLIGRAM(S): 20 TABLET, FILM COATED ORAL at 23:12

## 2019-11-12 RX ADMIN — Medication 81 MILLIGRAM(S): at 23:12

## 2019-11-12 RX ADMIN — FENTANYL CITRATE 25 MICROGRAM(S): 50 INJECTION INTRAVENOUS at 21:10

## 2019-11-12 RX ADMIN — SODIUM CHLORIDE 125 MILLILITER(S): 9 INJECTION, SOLUTION INTRAVENOUS at 10:26

## 2019-11-12 RX ADMIN — Medication 10 MILLIGRAM(S): at 17:55

## 2019-11-12 RX ADMIN — CARVEDILOL PHOSPHATE 12.5 MILLIGRAM(S): 80 CAPSULE, EXTENDED RELEASE ORAL at 19:41

## 2019-11-12 NOTE — CONSULT NOTE ADULT - SUBJECTIVE AND OBJECTIVE BOX
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 Pt underwent uncomplicated partial nephrectomy 11/1. Hospital course was complicated by post-op anemia and received 2 units of PRBC with stabilization of Hgb and was discharged. Patient started c/o gross hematuria a week ago, the day after discharge. He was instructed to come into ER for evaluation, but urine color improved and he didn't come to ER. Pt continued to have intermittent hematuria and eventually went to outside ER, where his H/H was 5.8/17.8. He was transferred to [facility], received 1 unit PRBC. Denies lightheadedness/dizziness/fever/chills/pain/nausea/vomiting. Denies any difficulty voiding.     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 No tinnitus, vertigo; No sinus or throat pain  NECK: No pain or stiffness  BREASTS: No pain, masses, or nipple discharge  RESPIRATORY: No cough, wheezing, chills or hemoptysis; No shortness of breath  CARDIOVASCULAR: No chest pain, palpitations, dizziness, or leg swelling  GASTROINTESTINAL: No abdominal or epigastric pain. No nausea, vomiting, or hematemesis; No diarrhea or constipation. No melena or hematochezia.  GENITOURINARY: +Hematuria   NEUROLOGICAL: No headaches, memory loss, loss of strength, numbness, or tremors  SKIN: No itching, burning, rashes, or lesions   LYMPH NODES: No enlarged glands  ENDOCRINE: No heat or cold intolerance; No hair loss  MUSCULOSKELETAL: No joint pain or swelling; No muscle, back, or extremity pain  PSYCHIATRIC: No depression, anxiety, mood swings, or difficulty sleeping  HEME/LYMPH: No easy bruising, or bleeding gums  ALLERY AND IMMUNOLOGIC: No hives or eczema    Allergies    No Known Allergies      Social History:   Occasional alcohol   Former smoker     FAMILY HISTORY:  No family history of renal CA    HOME MEDICATIONS:  omeprazole 20 mg oral delayed release capsule: 1 cap(s) orally once a day  ramipril 5 mg oral tablet: 1 tab(s) orally once a day  simvastatin 20 mg oral tablet: 1 tab(s) orally once a day  carvedilol 12.5 mg oral tablet: 1 tab(s) orally 2 times a day  aspirin 81 mg oral tablet: 1 tab(s) orally once a day  CoQ10: Last Dose Taken:  , 1 tab(s) orally once a day    Vital Signs Last 24 Hrs  T(C): 37.3 (04 Nov 2019 11:27), Max: 37.5 (03 Nov 2019 20:59)  T(F): 99.2 (04 Nov 2019 11:27), Max: 99.5 (03 Nov 2019 20:59)  HR: 87 (04 Nov 2019 11:27) (87 - 99)  BP: 97/43 (04 Nov 2019 11:27) (92/38 - 126/51)  BP(mean): --  RR: 16 (04 Nov 2019 11:27) (15 - 16)  SpO2: 98% (04 Nov 2019 11:27) (97% - 100%)  CAPILLARY BLOOD GLUCOSE        I&O's Summary    03 Nov 2019 07:01  -  04 Nov 2019 07:00  --------------------------------------------------------  IN: 925 mL / OUT: 1235 mL / NET: -310 mL    04 Nov 2019 07:01  -  04 Nov 2019 11:28  --------------------------------------------------------  IN: 0 mL / OUT: 375 mL / NET: -375 mL        PHYSICAL EXAM:  GENERAL: NAD  HEAD:  Atraumatic, Normocephalic  EYES: EOMI, PERRLA, conjunctiva and sclera clear  NECK: Supple, No JVD  CHEST/LUNG: Clear to auscultation bilaterally; No wheeze  HEART: Regular rate and rhythm; No murmurs, rubs, or gallops  ABDOMEN: Soft, Nontender, Nondistended; Bowel sounds present. Wounds c/d/i   EXTREMITIES:  2+ Peripheral Pulses, No clubbing, cyanosis, or edema  PSYCH: AAOx3  NEUROLOGY: non-focal  SKIN: RLQ ecchymosis     LABS:                        7.1    10.88 )-----------( 166      ( 04 Nov 2019 05:14 )             22.7     11-04    136  |  103  |  34<H>  ----------------------------<  107<H>  4.2   |  24  |  2.03<H>    Ca    8.1<L>      04 Nov 2019 05:14                RADIOLOGY & ADDITIONAL TESTS:    Imaging Personally Reviewed:  EKG tracing reviewed and interpreted by me: CHAVA CRESPO    Consultant(s) Notes Reviewed:      Care Discussed with Consultants/Other Providers: Spoke w/ RODNEY DUBOIS (Korin), will transfuse 2units PRBC and plan for IR angiogram

## 2019-11-12 NOTE — ED ADULT NURSE NOTE - OBJECTIVE STATEMENT
Patient received in room #19 @1162 as transfer from [facility]. Patient A&Ox3, received with blood transfusion in progress. Patient denies any pain or discomfort currently. Patient reports he noticed blood in his urine. Patient states he is on plavix. Hx. Kidney CA. Per triage note, Patient is s/p partial nephrectomy 11/1, steri strips observed to abdomen. Patient received with 18G IC to right forearm and 20G IV to LAC. Labs drawn and sent. Will monitor. Patient received in room #19 @4304 as transfer from [facility]. Patient A&Ox3, received with blood transfusion in progress. Patient denies any pain or discomfort currently. Patient reports he noticed blood in his urine. Patient states he is on plavix. Hx. Kidney CA. Per triage note, Patient is s/p partial nephrectomy 11/1, steri strips observed to abdomen. Patient received with 18G IC to right forearm and 20G IV to LAC. blanchable redness surrounding non-blanchable redness around sacral area observed. Labs drawn and sent. Will monitor. Patient received in room #19 @0477 as transfer from [facility]. Patient A&Ox3, received with blood transfusion in progress. Patient denies any pain or discomfort currently. Patient reports he noticed blood in his urine. Patient states he is on plavix. Hx. Kidney CA. Per triage note, Patient is s/p partial nephrectomy 11/1, steri strips observed to abdomen, multiple bruises around hip area. Patient received with 18G IC to right forearm and 20G IV to LAC. blanchable redness surrounding non-blanchable redness around sacral area observed. Labs drawn and sent. Will monitor.

## 2019-11-12 NOTE — ED ADULT NURSE NOTE - NSIMPLEMENTINTERV_GEN_ALL_ED
Implemented All Fall Risk Interventions:  [place] to call system. Call bell, personal items and telephone within reach. Instruct patient to call for assistance. Room bathroom lighting operational. Non-slip footwear when patient is off stretcher. Physically safe environment: no spills, clutter or unnecessary equipment. Stretcher in lowest position, wheels locked, appropriate side rails in place. Provide visual cue, wrist band, yellow gown, etc. Monitor gait and stability. Monitor for mental status changes and reorient to person, place, and time. Review medications for side effects contributing to fall risk. Reinforce activity limits and safety measures with patient and family.

## 2019-11-12 NOTE — ED PROVIDER NOTE - PHYSICAL EXAMINATION
Gen: no acute distress, well appearing, awake, alert and oriented x 3  Cardiac: regular rate and rhythm, +S1S2  Pulm: Clear to auscultation bilaterally  Abd: soft, mild ttp right side abd and flank with surgical site c/d/i, nondistended, no guarding  Back:  nontender spine  Extremity: no edema, no deformity, warm and well perfused, FROM all extremities    Neuro: awake, alert, oriented x 3, sensorimotor intact

## 2019-11-12 NOTE — CONSULT NOTE ADULT - PROBLEM SELECTOR RECOMMENDATION 4
Last stent in 2016   May hold ASA in setting of hematuria, patient no longer needs Plavix   Continue Coreg, statin

## 2019-11-12 NOTE — CONSULT NOTE ADULT - ASSESSMENT
PLAN: Mr. Murphy is a 87yom with PMH of CAD s/p stenting (2001+2016), HTN, HLD, kidney cancer s/p R lap partial nephrectomy and RPLND 11/1, who presented with gross hematuria and perinephric hematoma now s/p embolization.      NEURO:    Pain control with tylenol and fentanyl    RESPIRATORY:     Satting well on RA    CARDIOVASCULAR:    Hypertensive in PACU, takes ramipril 5mg and carvedilol 12.5mg at home  - Labetalol PRN    GI/NUTRITION:    NPO    GENITOURINARY/RENAL:    S/p arterial embolization of two lower pole vessels  Strict I/O's  Replete electrolytes as needed    HEMATOLOGIC:    S/p 3 u pRBC's total  Trend H+H  Holding DVT prophylaxis in context of bleed      INFECTIOUS DISEASE:    Afebrile  Trend WBC    ENDOCRINE:    No active issues    DISPO: PACU PLAN: Mr. Murphy is a 87yom with PMH of CAD s/p stenting (2001+2016), HTN, HLD, kidney cancer s/p R lap partial nephrectomy and RPLND 11/1, who presented with gross hematuria and perinephric hematoma now s/p embolization.      NEURO:    Pain control with tylenol and fentanyl    RESPIRATORY:     Satting well on RA    CARDIOVASCULAR:    Hypertensive in PACU, takes ramipril 5mg and carvedilol 12.5mg at home  - Labetalol PRN    GI/NUTRITION:    NPO    GENITOURINARY/RENAL:    S/p arterial embolization of two lower pole vessels  Strict I/O's  Replete electrolytes as needed    HEMATOLOGIC:    S/p 5 u pRBC's total  Trend H+H  Holding DVT prophylaxis in context of bleed      INFECTIOUS DISEASE:    Afebrile  Trend WBC    ENDOCRINE:    No active issues    DISPO: PACU

## 2019-11-12 NOTE — H&P ADULT - ASSESSMENT
86yo M s/p partial nephrectomy (11/1) c/b hematuria, anemia.   - NPO/IVF   - IR for possible angio/embolization  - s/p 1UPRBC, transfuse +1 more unit PRBC, post transfusion CBC  - Post-void residual +/- mckeon catheter  - Monitor color

## 2019-11-12 NOTE — PROGRESS NOTE ADULT - ASSESSMENT
This 86 yo M s/p IR RDarcy renal angio/embo segmental artery    Plan:  - monitor outputs  - monitor VS  - check CBC'S  - transfer to SICU for observation (requested by IR anesthesia)

## 2019-11-12 NOTE — H&P ADULT - ATTENDING COMMENTS
Admit for gross hematuria.  Transfuse 2 unit PRBC.    Discuss with IR for renal angiogram given recent laparoscopic partial nephrectomy.

## 2019-11-12 NOTE — CONSULT NOTE ADULT - ASSESSMENT
87M h/o CAD s/p stents (2001, 2016), HTN, HLD, kidney cancer s/p R lap partial nephrectomy and RPLND 11/1, transferred from OSH w/ acute blood loss anemia due to gross hematuria

## 2019-11-12 NOTE — H&P ADULT - NSHPLABSRESULTS_GEN_ALL_CORE
CBC (11-12 @ 05:30)                              6.5<LL>                         9.85    )----------------(  365        --    % Neutrophils, --    % Lymphocytes, ANC: --                                  20.6<LL>              CBC (11-12 @ 00:25)                              5.8<LL>                         11.81<H>  )----------------(  296        79.2<H>% Neutrophils, 10.7<L>% Lymphocytes, ANC: 9.35<H>                              17.8<LL>                BMP (11-12 @ 00:25)             142     |  112<H>  |  35<H> 		Ca++ --      Ca 8.2<L>             ---------------------------------( 114<H>		Mg --                 4.3     |  23      |  1.80<H>			Ph --        LFTs (11-12 @ 00:25)      TPro 5.4<L> / Alb 2.4<L> / TBili 0.3 / DBili -- / AST 30 / ALT 37 / AlkPhos 61    Coags (11-12 @ 05:30)  aPTT 28.9 / INR 1.10 / PT 12.6    ABG (11-12 @ 00:25)      /  /  /  /  / %     Lactate:  0.6<L>

## 2019-11-12 NOTE — CONSULT NOTE ADULT - PROBLEM SELECTOR RECOMMENDATION 2
s/p R lap partial nephrectomy and RPLND 11/1, now w/ hematuria   Plan for right renal angiogram and embolization by IR today  Outpatient f/up

## 2019-11-12 NOTE — ED PROVIDER NOTE - CLINICAL SUMMARY MEDICAL DECISION MAKING FREE TEXT BOX
Male presents to ED for evaluation of weakness, s/p recent surgery - Will check labs, EKG, imaging, medicate, consult urology

## 2019-11-12 NOTE — H&P ADULT - NSHPPHYSICALEXAM_GEN_ALL_CORE
General: well developed, well nourished, NAD  Neuro: alert and oriented, no focal deficits, moves all extremities spontaneously  HEENT: NCAT, EOMI, anicteric, mucosa moist  Respiratory: airway patent, respirations unlabored  CVS: regular rate and rhythm  Abdomen: soft, NT, ND, RLQ ecchymoses, port sites with dried blood, c/d/i  : dark red urine in urinal

## 2019-11-12 NOTE — CONSULT NOTE ADULT - PROBLEM SELECTOR RECOMMENDATION 3
In setting of partial nephrectomy   Creatinine better than at time of discharge   Transfuse blood as above   Avoid nephrotoxic agents   Monitor Cr

## 2019-11-12 NOTE — ED ADULT TRIAGE NOTE - CHIEF COMPLAINT QUOTE
Pt rcvd as transfer from [facility] for Urology consultation for hematuria, low Hgb (5.8). Urology ATTD MD Fields.  Pt is s/p partial nephrectomy 11/1, presented to ED earlier for c/o difficulty urinating & hematuria. Other Hx Kidney CA, CADx3 stents, HTN on ASA. Arrives w/ 18g to R Forearm & 20g pIVL to L AC, 1unit pRBC transfusing as ordered from VS (approx 1/3 done).  Pt VSS, denies pain at this time.  Charge RN Notified to patient.

## 2019-11-12 NOTE — PROGRESS NOTE ADULT - SUBJECTIVE AND OBJECTIVE BOX
Post procedure check    This 86 yo M is s/p IR renal angio/embo segmental artery R. kidney  PMH: CAD; HTN; chol  Pt is awake and alert  Has no c/o  During procedure pt felt he couldn't void; #16 mckeon passed by IR nurse;     after IR proc nurse claims it wasn't draining  I went to IR, tried flushing; switched to #20 and flushed, mod pink no clots  Afeb 183/78 79 104 99%  Abd- soft; NT; ND  Mckeon pink; about 100 cc   Labs                        9.3    11.44 )-----------( 271      ( 12 Nov 2019 14:05 )             28.6   Recieved 3 UPC in IR Post procedure check    This 88 yo M is s/p IR renal angio/embo segmental artery R. kidney  PMH: CAD; HTN; chol  Pt is awake and alert  Has no c/o  During procedure pt felt he couldn't void; #16 mckeon passed by IR nurse;     after IR proc nurse claims it wasn't draining  I went to IR, tried flushing; switched to #20 and flushed, mod pink no clots  Afeb 183/78 79 104 99%  Abd- soft; NT; ND  R. groin - no ecchymosis or hematoma; drsg C&D  Mckeon pink; about 100 cc   Labs                        9.3    11.44 )-----------( 271      ( 12 Nov 2019 14:05 )             28.6   Recieved 3 UPC in IR

## 2019-11-12 NOTE — ED ADULT NURSE NOTE - CHIEF COMPLAINT QUOTE
Pt rcvd as transfer from [facility] for Urology consultation for hematuria, low Hgb (5.8). Urology ATTD MD Fields.  Pt is s/p partial nephrectomy 11/1, presented to ED earlier for c/o difficulty urinating & hematuria. Other Hx Kidney CA, CADx3 stents, HTN on ASA. Arrives w/ 18g to R Forearm & 20g pIVL to L AC, 1unit pRBC transfusing as ordered from VS (approx 1/3 done).  Pt VSS, denies pain at this time.  Charge RN Notified to patient.

## 2019-11-12 NOTE — CHART NOTE - NSCHARTNOTEFT_GEN_A_CORE
Patient Age: 87    Patient Gender: M    Procedure (including site / side if known): R. renal angio/embo    Diagnosis / Indication: bleeding s/p R. 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

## 2019-11-12 NOTE — ED PROVIDER NOTE - PROGRESS NOTE DETAILS
I discussed the case with urology PA, recommends CT AP IV contrast I discussed the case with Dr. Thompson who reports that Dr. Navarro is requestng that patient be transferred to [facility]. Dr. Sales accepted patient for transfer to [facility] ED.

## 2019-11-12 NOTE — ED ADULT NURSE NOTE - INTERVENTIONS DEFINITIONS
Stretcher in lowest position, wheels locked, appropriate side rails in place/Call bell, personal items and telephone within reach/[place]ash to call system/Instruct patient to call for assistance/Non-slip footwear when patient is off stretcher

## 2019-11-12 NOTE — ED PROVIDER NOTE - OBJECTIVE STATEMENT
86 yo transferred from OSH for persistent hematuria and severe anemia in the setting of a recent nephrectomy.  Currently pt with no complaints and no pain.  Feels a little tired.  Has first unit of pRBC infusing on arrival.

## 2019-11-12 NOTE — CONSULT NOTE ADULT - SUBJECTIVE AND OBJECTIVE BOX
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  The patient underwent uncomplicated partial nephrectomy on 11/1.  His hospital stay was c/b small drop in h/h, received 2U PRBC; however, repeat HCT stable and discharged for follow-up.  The day following his discharge, he called the service line with complaints of hematuria.  He was instructed to come into ER for evaluation.  He stated he would come in next day; however, color normalized and he did not come to ER.     His urine remained intermittently hematuric and was advised to come into hospital last night where his H/h was shown to be 5.8/17.8.  Transferred to [facility] for further workup.  Received 1UPRBC.  Denies lightheadedness/dizziness/fever/chills/pain/nausea/vomiting.  Feels like he is emptyiing bladder. (12 Nov 2019 07:21)    Following admission, patient was taken urgently to IR for embolization.  Arteriography demonstrated active extravasation from two lower segment arteries, both of which were embolized.  SICU was consulted for hemodynamic monitoring following embolization.  In the PACU patients says he is feeling well.  He denies abdominal pain, flank pain, chest pain, shortness of breath, or nausea.        PAST MEDICAL HISTORY: Cancer of kidney  CAD (coronary artery disease)  CAD (coronary atherosclerotic disease)  Hyperlipidemia  HTN (hypertension)      PAST SURGICAL HISTORY: Stented coronary artery  History of total hip replacement      FAMILY HISTORY: Family history unknown      CODE STATUS:     ALLERGIES: No Known Allergies      VITAL SIGNS:  ICU Vital Signs Last 24 Hrs  T(C): 36.7 (12 Nov 2019 10:32), Max: 37.1 (11 Nov 2019 20:25)  T(F): 98 (12 Nov 2019 10:32), Max: 98.8 (12 Nov 2019 05:45)  HR: 84 (12 Nov 2019 10:32) (70 - 96)  BP: 155/66 (12 Nov 2019 10:32) (118/56 - 159/63)  BP(mean): --  ABP: --  ABP(mean): --  RR: 16 (12 Nov 2019 10:32) (13 - 22)  SpO2: 100% (12 Nov 2019 10:32) (97% - 100%)      NEURO    A+Ox3    RESPIRATORY  No accessory muscle use, satting well on RA  ABG - ( 12 Nov 2019 14:20 )  pH: 7.32  /  pCO2: 46    /  pO2: 253   / HCO3: 22    / Base Excess: -2.6  /  SaO2: 99.4    Lactate: 1.0      CARDIOVASCULAR  Hypertensive to 170's/90s, HR 80's    GI/NUTRITION    Abdomen soft, non-tender       Weight (kg): 72 (11-12 @ 08:47), 73.9 (11-11 @ 20:25)  11-12    139  |  109<H>  |  27<H>  ----------------------------<  112<H>  4.8   |  22  |  1.58<H>    Ca    7.6<L>      12 Nov 2019 14:05    TPro  4.7<L>  /  Alb  2.6<L>  /  TBili  0.4  /  DBili  x   /  AST  16  /  ALT  20  /  AlkPhos  54  11-12      HEMATOLOGIC                        9.3    11.44 )-----------( 271      ( 12 Nov 2019 14:05 )             28.6     PT/INR - ( 12 Nov 2019 14:05 )   PT: 12.8 SEC;   INR: 1.15          PTT - ( 12 Nov 2019 14:05 )  PTT:28.8 SEC    INFECTIOUS DISEASES  RECENT CULTURES:      ENDOCRINE  CAPILLARY BLOOD GLUCOSE          IMAGING STUDIES:  COMPARISON: CT abdomen pelvis 10/7/2019    PROCEDURE:   CT of the Abdomen and Pelvis was performed with intravenous contrast.   Intravenous contrast: 80 ml Omnipaque 350. 20 ml discarded.  Oral contrast: None.  Sagittal and coronal reformats were performed.    FINDINGS:    LOWER CHEST: Subcutaneous air in the chest wall bilaterally. Trace right   pleural effusion with minimal bilateral dependent atelectasis.    LIVER: Within normal limits.  BILE DUCTS: Normal caliber.  GALLBLADDER: Cholecystectomy.  SPLEEN: Within normal limits.  PANCREAS: Within normal limits.  ADRENALS: Within normal limits.  KIDNEYS/URETERS: Interval wedge defect in the of the right kidney. Right   kidney is hypoenhancing and compared to the left. Heterogeneous right   renal enhancement with wedge-shaped perfusion defects. There is a   perinephric hematoma measuring 2.6 cm in maximal thickness along the   posterior aspect of the kidney.. Mild proximal right hydroureter with   high density material seen in the mid ureter, likely hemorrhage.    BLADDER/REPRODUCTIVE ORGANS: Extensive streak artifact limits evaluation   of the bladder and prostate gland. Fiducial markers in the prostate gland.  BOWEL: No bowel obstruction.   PERITONEUM: Trace ascites and pneumoperitoneum, likely postoperative..  VESSELS:  Atherosclerotic aortic calcifications.  RETROPERITONEUM: Trace right retroperitoneal fluid. No lymphadenopathy.    ABDOMINAL WALL: Diffuse subcutaneous air in the abdominal and chest wall.   3 x 2 cm hyperdense soft tissue density in the ventral abdominal wall,   likely hematoma.  BONES: Degenerative changes of the spine.    IMPRESSION: Status post wedge nephrectomy of the right kidney with   right-sided perinephric hematoma.    Poor enhancement of the right kidney with wedge-shaped perfusion defects.   Differential includes renal infarcts versus pyelonephritis.    Probable blood clots in the mid right ureter resulting in mild   obstruction of the proximal ureter. 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  The patient underwent uncomplicated partial nephrectomy on 11/1.  His hospital stay was c/b small drop in h/h, received 2U PRBC; however, repeat HCT stable and discharged for follow-up.  The day following his discharge, he called the service line with complaints of hematuria.  He was instructed to come into ER for evaluation.  He stated he would come in next day; however, color normalized and he did not come to ER.     His urine remained intermittently hematuric and was advised to come into hospital last night where his H/h was shown to be 5.8/17.8.  Transferred to [facility] for further workup.  Received 1UPRBC.  Denies lightheadedness/dizziness/fever/chills/pain/nausea/vomiting.  Feels like he is emptyiing bladder. (12 Nov 2019 07:21)    Following admission, patient was taken urgently to IR for embolization.  Arteriography demonstrated active extravasation from two lower segment arteries, both of which were embolized.  He received two units intraoperatively.  SICU was consulted for hemodynamic monitoring following embolization.  In the PACU patients says he is feeling well.  He denies abdominal pain, flank pain, chest pain, shortness of breath, or nausea.        PAST MEDICAL HISTORY: Cancer of kidney  CAD (coronary artery disease)  CAD (coronary atherosclerotic disease)  Hyperlipidemia  HTN (hypertension)      PAST SURGICAL HISTORY: Stented coronary artery  History of total hip replacement      FAMILY HISTORY: Family history unknown      CODE STATUS:     ALLERGIES: No Known Allergies      VITAL SIGNS:  ICU Vital Signs Last 24 Hrs  T(C): 36.7 (12 Nov 2019 10:32), Max: 37.1 (11 Nov 2019 20:25)  T(F): 98 (12 Nov 2019 10:32), Max: 98.8 (12 Nov 2019 05:45)  HR: 84 (12 Nov 2019 10:32) (70 - 96)  BP: 155/66 (12 Nov 2019 10:32) (118/56 - 159/63)  BP(mean): --  ABP: --  ABP(mean): --  RR: 16 (12 Nov 2019 10:32) (13 - 22)  SpO2: 100% (12 Nov 2019 10:32) (97% - 100%)      NEURO    A+Ox3    RESPIRATORY  No accessory muscle use, satting well on RA  ABG - ( 12 Nov 2019 14:20 )  pH: 7.32  /  pCO2: 46    /  pO2: 253   / HCO3: 22    / Base Excess: -2.6  /  SaO2: 99.4    Lactate: 1.0      CARDIOVASCULAR  Hypertensive to 170's/90s, HR 80's    GI/NUTRITION    Abdomen soft, non-tender       Weight (kg): 72 (11-12 @ 08:47), 73.9 (11-11 @ 20:25)  11-12    139  |  109<H>  |  27<H>  ----------------------------<  112<H>  4.8   |  22  |  1.58<H>    Ca    7.6<L>      12 Nov 2019 14:05    TPro  4.7<L>  /  Alb  2.6<L>  /  TBili  0.4  /  DBili  x   /  AST  16  /  ALT  20  /  AlkPhos  54  11-12      HEMATOLOGIC                        9.3    11.44 )-----------( 271      ( 12 Nov 2019 14:05 )             28.6     PT/INR - ( 12 Nov 2019 14:05 )   PT: 12.8 SEC;   INR: 1.15          PTT - ( 12 Nov 2019 14:05 )  PTT:28.8 SEC    INFECTIOUS DISEASES  RECENT CULTURES:      ENDOCRINE  CAPILLARY BLOOD GLUCOSE          IMAGING STUDIES:  COMPARISON: CT abdomen pelvis 10/7/2019    PROCEDURE:   CT of the Abdomen and Pelvis was performed with intravenous contrast.   Intravenous contrast: 80 ml Omnipaque 350. 20 ml discarded.  Oral contrast: None.  Sagittal and coronal reformats were performed.    FINDINGS:    LOWER CHEST: Subcutaneous air in the chest wall bilaterally. Trace right   pleural effusion with minimal bilateral dependent atelectasis.    LIVER: Within normal limits.  BILE DUCTS: Normal caliber.  GALLBLADDER: Cholecystectomy.  SPLEEN: Within normal limits.  PANCREAS: Within normal limits.  ADRENALS: Within normal limits.  KIDNEYS/URETERS: Interval wedge defect in the of the right kidney. Right   kidney is hypoenhancing and compared to the left. Heterogeneous right   renal enhancement with wedge-shaped perfusion defects. There is a   perinephric hematoma measuring 2.6 cm in maximal thickness along the   posterior aspect of the kidney.. Mild proximal right hydroureter with   high density material seen in the mid ureter, likely hemorrhage.    BLADDER/REPRODUCTIVE ORGANS: Extensive streak artifact limits evaluation   of the bladder and prostate gland. Fiducial markers in the prostate gland.  BOWEL: No bowel obstruction.   PERITONEUM: Trace ascites and pneumoperitoneum, likely postoperative..  VESSELS:  Atherosclerotic aortic calcifications.  RETROPERITONEUM: Trace right retroperitoneal fluid. No lymphadenopathy.    ABDOMINAL WALL: Diffuse subcutaneous air in the abdominal and chest wall.   3 x 2 cm hyperdense soft tissue density in the ventral abdominal wall,   likely hematoma.  BONES: Degenerative changes of the spine.    IMPRESSION: Status post wedge nephrectomy of the right kidney with   right-sided perinephric hematoma.    Poor enhancement of the right kidney with wedge-shaped perfusion defects.   Differential includes renal infarcts versus pyelonephritis.    Probable blood clots in the mid right ureter resulting in mild   obstruction of the proximal ureter.

## 2019-11-12 NOTE — CONSULT NOTE ADULT - PROBLEM SELECTOR RECOMMENDATION 9
Acute blood loss anemia d/t hematuria in setting of recent partial nephrectomy   s/p 1unit PRBC at OSH, receiving additional unit of PRBC now   Monitor CBC   Plan for right renal angiogram and embolization by IR today

## 2019-11-12 NOTE — ED PROVIDER NOTE - PHYSICAL EXAMINATION
Gen: Well appearing in NAD  Head: NC/AT  Neck: trachea midline  Resp:  No distress  Abd: soft NT ND  Ext: no deformities  Neuro:  A&O appears non focal  Skin:  Warm and dry as visualized - all incision sites are c/d/i  Psych:  Normal affect and mood

## 2019-11-12 NOTE — H&P ADULT - HISTORY OF PRESENT ILLNESS
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  The patient underwent uncomplicated partial nephrectomy on 11/1.  His hospital stay was c/b small drop in h/h, received 2U PRBC; however, repeat HCT stable and discharged for follow-up.  The day following his discharge, he called the service line with complaints of hematuria.  He was instructed to come into ER for evaluation.  He stated he would come in next day; however, color normalized and he did not come to ER.     His urine remained intermittently hematuric and was advised to come into hospital last night where his H/h was shown to be 5.8/17.8.  Transferred to [facility] for further workup.  Received 1UPRBC.  Denies lightheadedness/dizziness/fever/chills/pain/nausea/vomiting.  Feels like he is emptyiing bladder.

## 2019-11-12 NOTE — ED ADULT NURSE NOTE - CHIEF COMPLAINT QUOTE
BIBA,  pt c/o difficulty urinating and urine in blood today,  pt states he had kidney surgery on 11/1/19 at [facility].   pt c/o incontinent.

## 2019-11-12 NOTE — PROGRESS NOTE ADULT - SUBJECTIVE AND OBJECTIVE BOX
Vascular & Interventional Radiology Post-Procedure Note    Pre-Procedure Diagnosis: Post Partial Nephrectomy  Post-Procedure Diagnosis: Same as pre.  Indications for Procedure: Persistent hematuria    : Aguila ADAMES, Yas ADAMES    Procedure Details/Findings: Access via R CFA. Angiogram shows active extravasation from two distinct lower pole vessels. Embolization of these vessels was performed using avitene, micoroils and microvascular plugs. Closure with 5F Mynx device. R Groin soft, non tender, no hematoma. Mild soft tissue swelling and ecchymosis in R Groin noted prior to procedure. R Fem 2+. R Dp audible on doppler.     Complications: None  Estimated Blood Loss: Minimal  Specimen: N/A  Contrast: 70ml  Sedation: MAC  Patient Condition/Disposition: Stable. PACU then Floor

## 2019-11-12 NOTE — ED PROVIDER NOTE - CLINICAL SUMMARY MEDICAL DECISION MAKING FREE TEXT BOX
pt transferred with known anemia from  source.  Here HD stable.  Will consult uro and continue transfusions.  Will get coags and type along with continuing transfusion.  Will need uro admission

## 2019-11-12 NOTE — ED ADULT NURSE NOTE - OBJECTIVE STATEMENT
ao x3 , hard of hearing , reported difficulty urinating and urine in blood today,  pt states he had kidney surgery on 11/1/19 at [facility].   pt c/o incontinency

## 2019-11-12 NOTE — ED PROVIDER NOTE - CRITICAL CARE PROVIDED
conducted a detailed discussion of DNR status/additional history taking/direct patient care (not related to procedure)/documentation/interpretation of diagnostic studies/consultation with other physicians

## 2019-11-12 NOTE — ED PROVIDER NOTE - OBJECTIVE STATEMENT
86 yo male s/p recent right partial nephrectomy presents to ED for evaluation of difficulty urinating and hematuria 88 yo male s/p recent right partial nephrectomy on 11/1/19 presents to ED for evaluation of difficulty urinating and hematuria. Patient reports he feels generalized weakness but denies pain. Reports he hardly has any pain at surgical site at this time, just bruising over the area. Denies fevers, chills, chest pain, shortness of breath.

## 2019-11-12 NOTE — ED ADULT NURSE NOTE - CAS EDN DISCHARGE ASSESSMENT
Alert and oriented to person, place and time/Awake/Symptoms improved/No adverse reaction to first time med in ED/Dressing clean and dry

## 2019-11-12 NOTE — ED ADULT NURSE REASSESSMENT NOTE - NS ED NURSE REASSESS COMMENT FT1
PRBC STARTED , PATIENT TOLERATED IT WELL , NO CHEST PAIN , NO DIFFICULTY BREATHING , DENIES FLANK PAIN , LUNGS SOUNDS CLEAR . TRANSFERRED TO [facility] AT THIS MOMENT , REPORT TO [facility] and transfer center giving

## 2019-11-12 NOTE — ED ADULT NURSE REASSESSMENT NOTE - NS ED NURSE REASSESS COMMENT FT1
Blood transfusion completed at 07:00. Patient appears in NAD, respirations even and unlabored. Will monitor.

## 2019-11-13 LAB
ANION GAP SERPL CALC-SCNC: 13 MMO/L — SIGNIFICANT CHANGE UP (ref 7–14)
BASOPHILS # BLD AUTO: 0.02 K/UL — SIGNIFICANT CHANGE UP (ref 0–0.2)
BASOPHILS NFR BLD AUTO: 0.1 % — SIGNIFICANT CHANGE UP (ref 0–2)
BUN SERPL-MCNC: 26 MG/DL — HIGH (ref 7–23)
CALCIUM SERPL-MCNC: 8.4 MG/DL — SIGNIFICANT CHANGE UP (ref 8.4–10.5)
CHLORIDE SERPL-SCNC: 107 MMOL/L — SIGNIFICANT CHANGE UP (ref 98–107)
CO2 SERPL-SCNC: 19 MMOL/L — LOW (ref 22–31)
CREAT SERPL-MCNC: 1.61 MG/DL — HIGH (ref 0.5–1.3)
EOSINOPHIL # BLD AUTO: 0.2 K/UL — SIGNIFICANT CHANGE UP (ref 0–0.5)
EOSINOPHIL NFR BLD AUTO: 1.4 % — SIGNIFICANT CHANGE UP (ref 0–6)
GLUCOSE SERPL-MCNC: 86 MG/DL — SIGNIFICANT CHANGE UP (ref 70–99)
HCT VFR BLD CALC: 34.6 % — LOW (ref 39–50)
HGB BLD-MCNC: 12.2 G/DL — LOW (ref 13–17)
IMM GRANULOCYTES NFR BLD AUTO: 0.5 % — SIGNIFICANT CHANGE UP (ref 0–1.5)
LYMPHOCYTES # BLD AUTO: 1.15 K/UL — SIGNIFICANT CHANGE UP (ref 1–3.3)
LYMPHOCYTES # BLD AUTO: 8 % — LOW (ref 13–44)
MAGNESIUM SERPL-MCNC: 2.2 MG/DL — SIGNIFICANT CHANGE UP (ref 1.6–2.6)
MCHC RBC-ENTMCNC: 30.5 PG — SIGNIFICANT CHANGE UP (ref 27–34)
MCHC RBC-ENTMCNC: 35.3 % — SIGNIFICANT CHANGE UP (ref 32–36)
MCV RBC AUTO: 86.5 FL — SIGNIFICANT CHANGE UP (ref 80–100)
MONOCYTES # BLD AUTO: 1.17 K/UL — HIGH (ref 0–0.9)
MONOCYTES NFR BLD AUTO: 8.1 % — SIGNIFICANT CHANGE UP (ref 2–14)
NEUTROPHILS # BLD AUTO: 11.76 K/UL — HIGH (ref 1.8–7.4)
NEUTROPHILS NFR BLD AUTO: 81.9 % — HIGH (ref 43–77)
NRBC # FLD: 0 K/UL — SIGNIFICANT CHANGE UP (ref 0–0)
PHOSPHATE SERPL-MCNC: 4 MG/DL — SIGNIFICANT CHANGE UP (ref 2.5–4.5)
PLATELET # BLD AUTO: 305 K/UL — SIGNIFICANT CHANGE UP (ref 150–400)
PMV BLD: 8.9 FL — SIGNIFICANT CHANGE UP (ref 7–13)
POTASSIUM SERPL-MCNC: 4.5 MMOL/L — SIGNIFICANT CHANGE UP (ref 3.5–5.3)
POTASSIUM SERPL-SCNC: 4.5 MMOL/L — SIGNIFICANT CHANGE UP (ref 3.5–5.3)
RBC # BLD: 4 M/UL — LOW (ref 4.2–5.8)
RBC # FLD: 14.6 % — HIGH (ref 10.3–14.5)
SODIUM SERPL-SCNC: 139 MMOL/L — SIGNIFICANT CHANGE UP (ref 135–145)
WBC # BLD: 14.37 K/UL — HIGH (ref 3.8–10.5)
WBC # FLD AUTO: 14.37 K/UL — HIGH (ref 3.8–10.5)

## 2019-11-13 PROCEDURE — 99232 SBSQ HOSP IP/OBS MODERATE 35: CPT

## 2019-11-13 RX ORDER — HYDRALAZINE HCL 50 MG
10 TABLET ORAL ONCE
Refills: 0 | Status: COMPLETED | OUTPATIENT
Start: 2019-11-13 | End: 2019-11-13

## 2019-11-13 RX ORDER — HYDROMORPHONE HYDROCHLORIDE 2 MG/ML
0.5 INJECTION INTRAMUSCULAR; INTRAVENOUS; SUBCUTANEOUS EVERY 4 HOURS
Refills: 0 | Status: DISCONTINUED | OUTPATIENT
Start: 2019-11-13 | End: 2019-11-13

## 2019-11-13 RX ORDER — ACETAMINOPHEN 500 MG
1000 TABLET ORAL ONCE
Refills: 0 | Status: COMPLETED | OUTPATIENT
Start: 2019-11-13 | End: 2019-11-13

## 2019-11-13 RX ORDER — OXYCODONE HYDROCHLORIDE 5 MG/1
5 TABLET ORAL EVERY 4 HOURS
Refills: 0 | Status: DISCONTINUED | OUTPATIENT
Start: 2019-11-13 | End: 2019-11-19

## 2019-11-13 RX ORDER — CLEVIDIPINE BUTYRATE 50MG/100ML
5 VIAL (ML) INTRAVENOUS
Qty: 25 | Refills: 0 | Status: DISCONTINUED | OUTPATIENT
Start: 2019-11-13 | End: 2019-11-13

## 2019-11-13 RX ORDER — INFLUENZA VIRUS VACCINE 15; 15; 15; 15 UG/.5ML; UG/.5ML; UG/.5ML; UG/.5ML
0.5 SUSPENSION INTRAMUSCULAR ONCE
Refills: 0 | Status: COMPLETED | OUTPATIENT
Start: 2019-11-13 | End: 2019-11-13

## 2019-11-13 RX ORDER — ACETAMINOPHEN 500 MG
1000 TABLET ORAL ONCE
Refills: 0 | Status: COMPLETED | OUTPATIENT
Start: 2019-11-14 | End: 2019-11-14

## 2019-11-13 RX ORDER — ACETAMINOPHEN 500 MG
650 TABLET ORAL EVERY 6 HOURS
Refills: 0 | Status: DISCONTINUED | OUTPATIENT
Start: 2019-11-13 | End: 2019-11-19

## 2019-11-13 RX ADMIN — HEPARIN SODIUM 5000 UNIT(S): 5000 INJECTION INTRAVENOUS; SUBCUTANEOUS at 14:43

## 2019-11-13 RX ADMIN — Medication 81 MILLIGRAM(S): at 11:17

## 2019-11-13 RX ADMIN — SODIUM CHLORIDE 50 MILLILITER(S): 9 INJECTION, SOLUTION INTRAVENOUS at 07:50

## 2019-11-13 RX ADMIN — CARVEDILOL PHOSPHATE 12.5 MILLIGRAM(S): 80 CAPSULE, EXTENDED RELEASE ORAL at 07:42

## 2019-11-13 RX ADMIN — HEPARIN SODIUM 5000 UNIT(S): 5000 INJECTION INTRAVENOUS; SUBCUTANEOUS at 05:20

## 2019-11-13 RX ADMIN — OXYCODONE HYDROCHLORIDE 5 MILLIGRAM(S): 5 TABLET ORAL at 20:29

## 2019-11-13 RX ADMIN — OXYCODONE HYDROCHLORIDE 5 MILLIGRAM(S): 5 TABLET ORAL at 14:45

## 2019-11-13 RX ADMIN — HYDROMORPHONE HYDROCHLORIDE 0.5 MILLIGRAM(S): 2 INJECTION INTRAMUSCULAR; INTRAVENOUS; SUBCUTANEOUS at 05:37

## 2019-11-13 RX ADMIN — Medication 400 MILLIGRAM(S): at 23:27

## 2019-11-13 RX ADMIN — CARVEDILOL PHOSPHATE 12.5 MILLIGRAM(S): 80 CAPSULE, EXTENDED RELEASE ORAL at 19:26

## 2019-11-13 RX ADMIN — PANTOPRAZOLE SODIUM 40 MILLIGRAM(S): 20 TABLET, DELAYED RELEASE ORAL at 07:50

## 2019-11-13 RX ADMIN — Medication 650 MILLIGRAM(S): at 11:18

## 2019-11-13 RX ADMIN — Medication 650 MILLIGRAM(S): at 10:43

## 2019-11-13 RX ADMIN — OXYCODONE HYDROCHLORIDE 5 MILLIGRAM(S): 5 TABLET ORAL at 21:25

## 2019-11-13 RX ADMIN — Medication 10 MILLIGRAM(S): at 21:01

## 2019-11-13 RX ADMIN — HEPARIN SODIUM 5000 UNIT(S): 5000 INJECTION INTRAVENOUS; SUBCUTANEOUS at 21:00

## 2019-11-13 RX ADMIN — Medication 10 MG/HR: at 03:00

## 2019-11-13 RX ADMIN — LISINOPRIL 20 MILLIGRAM(S): 2.5 TABLET ORAL at 05:20

## 2019-11-13 RX ADMIN — SIMVASTATIN 20 MILLIGRAM(S): 20 TABLET, FILM COATED ORAL at 21:00

## 2019-11-13 RX ADMIN — SODIUM CHLORIDE 125 MILLILITER(S): 9 INJECTION, SOLUTION INTRAVENOUS at 03:00

## 2019-11-13 RX ADMIN — OXYCODONE HYDROCHLORIDE 5 MILLIGRAM(S): 5 TABLET ORAL at 15:22

## 2019-11-13 NOTE — PROGRESS NOTE ADULT - SUBJECTIVE AND OBJECTIVE BOX
PROGRESS NOTE:     Patient is a 87y old  Male who presents with a chief complaint of Anemia s/p partial nephrectomy (2019 10:42)      SUBJECTIVE / OVERNIGHT EVENTS: Pt c/o discomfort from Mak catheter.     ADDITIONAL REVIEW OF SYSTEMS:    MEDICATIONS  (STANDING):  aspirin  chewable 81 milliGRAM(s) Oral daily  carvedilol 12.5 milliGRAM(s) Oral every 12 hours  heparin  Injectable 5000 Unit(s) SubCutaneous every 8 hours  influenza   Vaccine 0.5 milliLiter(s) IntraMuscular once  lisinopril 20 milliGRAM(s) Oral daily  pantoprazole    Tablet 40 milliGRAM(s) Oral before breakfast  simvastatin 20 milliGRAM(s) Oral at bedtime    MEDICATIONS  (PRN):  acetaminophen   Tablet .. 650 milliGRAM(s) Oral every 6 hours PRN Moderate Pain (4 - 6)  oxyCODONE    IR 5 milliGRAM(s) Oral every 4 hours PRN Severe Pain (7 - 10)  senna 2 Tablet(s) Oral at bedtime PRN Constipation      CAPILLARY BLOOD GLUCOSE        I&O's Summary    2019 07:01  -  2019 07:00  --------------------------------------------------------  IN: 1682 mL / OUT: 1660 mL / NET: 22 mL    2019 07:01  -  2019 15:01  --------------------------------------------------------  IN: 225 mL / OUT: 225 mL / NET: 0 mL        PHYSICAL EXAM:  Vital Signs Last 24 Hrs  T(C): 36.8 (2019 11:56), Max: 37.6 (2019 21:00)  T(F): 98.3 (2019 11:56), Max: 99.7 (2019 21:00)  HR: 78 (2019 11:56) (74 - 97)  BP: 131/61 (2019 11:56) (131/61 - 190/78)  BP(mean): 91 (2019 09:00) (86 - 106)  RR: 26 (2019 11:56) (17 - 30)  SpO2: 98% (2019 11:56) (94% - 100%)    CONSTITUTIONAL: NAD, well-developed  NECK: Supple, no JVD   RESPIRATORY: Normal respiratory effort; lungs are clear to auscultation bilaterally  CARDIOVASCULAR: Regular rate and rhythm, normal S1 and S2, no murmur/rub/gallop; No lower extremity edema; Peripheral pulses are 2+ bilaterally  ABDOMEN: Nontender to palpation, normoactive bowel sounds, no rebound/guarding; No hepatosplenomegaly  MUSCLOSKELETAL: no clubbing or cyanosis of digits; no joint swelling or tenderness to palpation  : Mak w/ brownish red urine   PSYCH: A+O to person, place, and time; affect appropriate    LABS:                        12.2   14.37 )-----------( 305      ( 2019 03:00 )             34.6     11-13    139  |  107  |  26<H>  ----------------------------<  86  4.5   |  19<L>  |  1.61<H>    Ca    8.4      2019 03:00  Phos  4.0     11-13  Mg     2.2     11-13    TPro  4.7<L>  /  Alb  2.6<L>  /  TBili  0.4  /  DBili  x   /  AST  16  /  ALT  20  /  AlkPhos  54  11-12    PT/INR - ( 2019 16:30 )   PT: 12.1 SEC;   INR: 1.09          PTT - ( 2019 16:30 )  PTT:24.3 SEC  CARDIAC MARKERS ( 2019 00:25 )  <.015 ng/mL / x     / x     / x     / x          Urinalysis Basic - ( 2019 04:08 )    Color: Red / Appearance: very cloudy / S.015 / pH: x  Gluc: x / Ketone: Trace  / Bili: Negative / Urobili: Negative mg/dL   Blood: x / Protein: 500 mg/dL / Nitrite: Negative   Leuk Esterase: Moderate / RBC: >50 /HPF / WBC >50   Sq Epi: x / Non Sq Epi: x / Bacteria: Many        Culture - Urine (collected 2019 09:43)  Source: .Urine Clean Catch (Midstream)  Preliminary Report (2019 12:29):    10,000 - 49,000 CFU/mL Gram positive organisms    <10,000 CFU/ml Normal Urogenital kasia present        RADIOLOGY & ADDITIONAL TESTS:  Results Reviewed:   Imaging Personally Reviewed:  Electrocardiogram Personally Reviewed:    COORDINATION OF CARE:  Care Discussed with Consultants/Other Providers [Y/N]:  Prior or Outpatient Records Reviewed [Y/N]:

## 2019-11-13 NOTE — PROGRESS NOTE ADULT - PROBLEM SELECTOR PLAN 1
Acute blood loss anemia d/t hematuria in setting of recent partial nephrectomy   s/p 2units PRBC   s/p right renal angiogram w/ embolization of bleeding vessel 11/12   Monitor CBC

## 2019-11-13 NOTE — PROGRESS NOTE ADULT - SUBJECTIVE AND OBJECTIVE BOX
Interventional Radiology Follow- Up Note    Patient states he is continuing to have mild pelvic pain that is unchanged. No other complaints offered No PRBC transfusion overnight.     Vitals: T(F): 98.6 (11-13-19 @ 08:00), Max: 99.7 (11-12-19 @ 21:00)  HR: 78 (11-13-19 @ 09:00) (74 - 97)  BP: 140/91 (11-13-19 @ 03:00) (140/91 - 190/78)  RR: 25 (11-13-19 @ 09:00) (16 - 30)  SpO2: 97% (11-13-19 @ 09:00) (94% - 100%)  Wt(kg): --    LABS:                        12.2   14.37 )-----------( 305      ( 13 Nov 2019 03:00 )             34.6     11-13    139  |  107  |  26<H>  ----------------------------<  86  4.5   |  19<L>  |  1.61<H>    Ca    8.4      13 Nov 2019 03:00  Phos  4.0     11-13  Mg     2.2     11-13    TPro  4.7<L>  /  Alb  2.6<L>  /  TBili  0.4  /  DBili  x   /  AST  16  /  ALT  20  /  AlkPhos  54  11-12    PT/INR - ( 12 Nov 2019 16:30 )   PT: 12.1 SEC;   INR: 1.09          PTT - ( 12 Nov 2019 16:30 )  PTT:24.3 SEC  I&O's Detail    12 Nov 2019 07:01  -  13 Nov 2019 07:00  --------------------------------------------------------  IN:    clevidipine Infusion: 5 mL    clevidipine Infusion: 12 mL    lactated ringers.: 1625 mL    Oral Fluid: 40 mL  Total IN: 1682 mL    OUT:    Indwelling Catheter - Urethral: 1660 mL  Total OUT: 1660 mL    Total NET: 22 mL      13 Nov 2019 07:01  -  13 Nov 2019 09:09  --------------------------------------------------------  IN:    lactated ringers.: 50 mL    Oral Fluid: 125 mL  Total IN: 175 mL    OUT:    Indwelling Catheter - Urethral: 125 mL  Total OUT: 125 mL    Total NET: 50 mL            PHYSICAL EXAM:    General: Nontoxic, in NAD  Neuro:  Alert & oriented x 3  Extremities: R Groin soft, non-tender, no hematoma. R Fem 1+.     Impression: 87y Male s/p right renal angio + embolization for post operative hemorrhage after partial right nephrectomy.     Plan:  -continue to monitor  -H/H stable.

## 2019-11-13 NOTE — PROGRESS NOTE ADULT - SUBJECTIVE AND OBJECTIVE BOX
POST ANESTHESIA EVALUATION    87y Male POSTOP DAY 1 S/P     MENTAL STATUS: Patient participation [ x ] Awake     [  ] Arousable     [  ] Sedated    AIRWAY PATENCY: [ x ] Satisfactory  [  ] Other:     Vital Signs Last 24 Hrs  T(C): 37 (13 Nov 2019 08:00), Max: 37.6 (12 Nov 2019 21:00)  T(F): 98.6 (13 Nov 2019 08:00), Max: 99.7 (12 Nov 2019 21:00)  HR: 76 (13 Nov 2019 10:00) (74 - 97)  BP: 134/79 (13 Nov 2019 09:00) (134/79 - 190/78)  BP(mean): 91 (13 Nov 2019 09:00) (86 - 106)  RR: 28 (13 Nov 2019 10:00) (17 - 30)  SpO2: 97% (13 Nov 2019 10:00) (94% - 100%)  I&O's Summary    12 Nov 2019 07:01  -  13 Nov 2019 07:00  --------------------------------------------------------  IN: 1682 mL / OUT: 1660 mL / NET: 22 mL    13 Nov 2019 07:01  -  13 Nov 2019 10:42  --------------------------------------------------------  IN: 225 mL / OUT: 225 mL / NET: 0 mL          NAUSEA/ VOMITTING:  [ x ] NONE  [  ] CONTROLLED [  ] OTHER     PAIN CONTROLLED WITH CURRENT REGIMEN    NO APPARENT ANESTHESIA COMPLICATIONS      Comments:   Questions regarding anesthesia answered

## 2019-11-13 NOTE — PROGRESS NOTE ADULT - SUBJECTIVE AND OBJECTIVE BOX
Subjective    Patient seen and examined. No overnight events. H/H stable. Denies pain. No fevers/chills/nausea/vomiting.     Objective    Vital signs  T(F): , Max: 99.7 (11-12-19 @ 21:00)  HR: 81 (11-13-19 @ 07:00)  BP: 140/91 (11-13-19 @ 03:00)  SpO2: 96% (11-13-19 @ 07:00)  Wt(kg): --    Output     11-12 @ 07:01  -  11-13 @ 07:00  --------------------------------------------------------  IN: 1682 mL / OUT: 1660 mL / NET: 22 mL        General: NAD  Abdomen: soft/non-tender/non-distended, incisions well healing  : mckeon in place. translucent red, draining well  R groin dressing c/d/i, no hematoma    Labs      11-13 @ 03:00    WBC 14.37 / Hct 34.6  / SCr 1.61     11-12 @ 16:30    WBC 11.59 / Hct 34.0  / SCr 1.61

## 2019-11-13 NOTE — PROGRESS NOTE ADULT - ASSESSMENT
PLAN: Mr. Murphy is a 87yom with PMH of CAD s/p stenting (2001+2016), HTN, HLD, kidney cancer s/p R lap partial nephrectomy and RPLND 11/1, who presented with gross hematuria and perinephric hematoma now s/p embolization.      NEURO: A&Ox3, tylenol 650 Q6, dilaudid .5 Q4  RESPIRATORY: satting well on RA  CARDIOVASCULAR: restarted lisinopril 20mg and carvedilol 12.5mg at home, now on cleviprex gtt 2mg/her, SBP < 155; s/p multiple IVPs hydralazine/labatelol  GI/NUTRITION: NPO/sips with meds  GENITOURINARY/RENAL: mckeon, strict Is and Os, LR @ 125, replete electrolytes as needed; s/p arterial embolization of two lower pole vessels  HEMATOLOGIC: s/p 5 u pRBC's total, last H&H 12.6/34.4; holding DVT prophylaxis in context of bleeding  INFECTIOUS DISEASE: afebrile, trend WBC  ENDOCRINE: No active issues    DISPO: PACU PLAN: Mr. Murphy is a 87yom with PMH of CAD s/p stenting (2001+2016), HTN, HLD, kidney cancer s/p R lap partial nephrectomy and RPLND 11/1, who presented with gross hematuria and perinephric hematoma now s/p embolization.      NEURO: A&Ox3, tylenol 650 Q6, dilaudid .5 Q4  RESPIRATORY: satting well on RA  CARDIOVASCULAR: restarted lisinopril 20mg and carvedilol 12.5mg at home, now on cleviprex gtt 2mg/her, SBP < 155; s/p multiple IVPs hydralazine/labatelol  GI/NUTRITION: Clears  GENITOURINARY/RENAL: mckeon, strict Is and Os, LR @ 50, replete electrolytes as needed; s/p arterial embolization of two lower pole vessels  HEMATOLOGIC: s/p 5 u pRBC's total, last H&H 12.6/34.4; holding DVT prophylaxis in context of bleeding  INFECTIOUS DISEASE: afebrile, trend WBC  ENDOCRINE: No active issues    DISPO: Floor    CC dx:    Post-procedure hypertension  hemorrhagic shock

## 2019-11-13 NOTE — PROGRESS NOTE ADULT - ASSESSMENT
87M s/p R lap partial nephrectomy on 11/1/19, postoperative hematuria with H/H drop, s/p IR selective angioembolization of two lower pole vessels 11/12.  5U PRBC transfused 11/12 in total. H/H now stable    Plan:  --monitor H/H  --continue mckeon, monitor color, irrigate PRN  --advance diet as tolerated  -- Out of bed, pain control, incentive spirometry, DVT prophylaxis   -- continue ASA, hold plavix  -- SICU care  -- likely transfer to floor today

## 2019-11-13 NOTE — PROGRESS NOTE ADULT - PROBLEM SELECTOR PLAN 2
s/p R lap partial nephrectomy and RPLND 11/1, complicated by hematuria   s/p right renal angiogram and embolization by IR 11/12  Outpatient f/up.

## 2019-11-13 NOTE — PROGRESS NOTE ADULT - PROBLEM SELECTOR PLAN 3
In setting of partial nephrectomy   Creatinine better than at time of discharge   Avoid nephrotoxic agents   Monitor Cr.

## 2019-11-13 NOTE — PROGRESS NOTE ADULT - SUBJECTIVE AND OBJECTIVE BOX
SICU Daily Progress Note:   ---------------------------------------------------------------------  Overnight events: After multiple IVPs anti-HTN meds, SBP > 200s upon transfer to SICU, cleviprex gtt started with SBP goal < 155. Will monitor.     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  The patient underwent uncomplicated partial nephrectomy on 11/1.  His hospital stay was c/b small drop in h/h, received 2U PRBC; however, repeat HCT stable and discharged for follow-up.  The day following his discharge, he called the service line with complaints of hematuria.  He was instructed to come into ER for evaluation.  He stated he would come in next day; however, color normalized and he did not come to ER.     His urine remained intermittently hematuric and was advised to come into hospital last night where his H/h was shown to be 5.8/17.8.  Transferred to [facility] for further workup.  Received 1UPRBC.  Denies lightheadedness/dizziness/fever/chills/pain/nausea/vomiting.  Feels like he is emptyiing bladder. (12 Nov 2019 07:21)    Following admission, patient was taken urgently to IR for embolization.  Arteriography demonstrated active extravasation from two lower segment arteries, both of which were embolized.  He received two units intraoperatively.  SICU was consulted for hemodynamic monitoring following embolization.  In the PACU patients says he is feeling well.  He denies abdominal pain, flank pain, chest pain, shortness of breath, or nausea.        PAST MEDICAL HISTORY: Cancer of kidney  CAD (coronary artery disease)  CAD (coronary atherosclerotic disease)  Hyperlipidemia  HTN (hypertension)      PAST SURGICAL HISTORY: Stented coronary artery  History of total hip replacement      FAMILY HISTORY: Family history unknown      CODE STATUS:     ALLERGIES: No Known Allergies    MEDICATIONS  (STANDING):  aspirin  chewable 81 milliGRAM(s) Oral daily  carvedilol 12.5 milliGRAM(s) Oral every 12 hours  clevidipine Infusion 5 mG/Hr (10 mL/Hr) IV Continuous <Continuous>  heparin  Injectable 5000 Unit(s) SubCutaneous every 8 hours  lactated ringers. 1000 milliLiter(s) (125 mL/Hr) IV Continuous <Continuous>  lisinopril 20 milliGRAM(s) Oral daily  pantoprazole    Tablet 40 milliGRAM(s) Oral before breakfast  simvastatin 20 milliGRAM(s) Oral at bedtime    MEDICATIONS  (PRN):  senna 2 Tablet(s) Oral at bedtime PRN Constipation  	    ICU Vital Signs Last 24 Hrs  T(C): 36.7 (13 Nov 2019 02:45), Max: 37.6 (12 Nov 2019 21:00)  T(F): 98 (13 Nov 2019 02:45), Max: 99.7 (12 Nov 2019 21:00)  HR: 87 (13 Nov 2019 02:45) (70 - 93)  BP: 162/61 (13 Nov 2019 02:45) (118/56 - 190/78)  BP(mean): 87 (13 Nov 2019 02:45) (86 - 106)  ABP: 180/74 (13 Nov 2019 02:45) (154/57 - 199/102)  ABP(mean): 116 (13 Nov 2019 02:45) (93 - 143)  RR: 26 (13 Nov 2019 02:45) (13 - 26)  SpO2: 96% (13 Nov 2019 02:45) (94% - 100%)    I&O's Detail    12 Nov 2019 07:01  -  13 Nov 2019 03:34  --------------------------------------------------------  IN:    lactated ringers.: 1375 mL    Oral Fluid: 40 mL  Total IN: 1415 mL    OUT:    Indwelling Catheter - Urethral: 1425 mL  Total OUT: 1425 mL    Total NET: -10 mL        NEURO:   A&Ox3    RESPIRATORY  Lungs CTA B/L    CARDIOVASCULAR  Hypertensive to 170's/90s, HR 80's    GI/NUTRITION  NPO, Abdomen soft, non-tender, nondistended    :  Mckeon with janeen blood    lines/tubes/drains: mckeon, right radial arterial line, PIV

## 2019-11-14 DIAGNOSIS — D72.829 ELEVATED WHITE BLOOD CELL COUNT, UNSPECIFIED: ICD-10-CM

## 2019-11-14 LAB
-  AMPICILLIN: SIGNIFICANT CHANGE UP
-  CIPROFLOXACIN: SIGNIFICANT CHANGE UP
-  LEVOFLOXACIN: SIGNIFICANT CHANGE UP
-  NITROFURANTOIN: SIGNIFICANT CHANGE UP
-  TETRACYCLINE: SIGNIFICANT CHANGE UP
-  VANCOMYCIN: SIGNIFICANT CHANGE UP
ANION GAP SERPL CALC-SCNC: 13 MMO/L — SIGNIFICANT CHANGE UP (ref 7–14)
BUN SERPL-MCNC: 36 MG/DL — HIGH (ref 7–23)
CALCIUM SERPL-MCNC: 8.2 MG/DL — LOW (ref 8.4–10.5)
CHLORIDE SERPL-SCNC: 102 MMOL/L — SIGNIFICANT CHANGE UP (ref 98–107)
CO2 SERPL-SCNC: 19 MMOL/L — LOW (ref 22–31)
CREAT SERPL-MCNC: 2.55 MG/DL — HIGH (ref 0.5–1.3)
CULTURE RESULTS: SIGNIFICANT CHANGE UP
GLUCOSE SERPL-MCNC: 97 MG/DL — SIGNIFICANT CHANGE UP (ref 70–99)
HCT VFR BLD CALC: 38.8 % — LOW (ref 39–50)
HGB BLD-MCNC: 12.9 G/DL — LOW (ref 13–17)
MCHC RBC-ENTMCNC: 29.7 PG — SIGNIFICANT CHANGE UP (ref 27–34)
MCHC RBC-ENTMCNC: 33.2 % — SIGNIFICANT CHANGE UP (ref 32–36)
MCV RBC AUTO: 89.2 FL — SIGNIFICANT CHANGE UP (ref 80–100)
METHOD TYPE: SIGNIFICANT CHANGE UP
NRBC # FLD: 0 K/UL — SIGNIFICANT CHANGE UP (ref 0–0)
ORGANISM # SPEC MICROSCOPIC CNT: SIGNIFICANT CHANGE UP
ORGANISM # SPEC MICROSCOPIC CNT: SIGNIFICANT CHANGE UP
PLATELET # BLD AUTO: 340 K/UL — SIGNIFICANT CHANGE UP (ref 150–400)
PMV BLD: 8.9 FL — SIGNIFICANT CHANGE UP (ref 7–13)
POTASSIUM SERPL-MCNC: 4.3 MMOL/L — SIGNIFICANT CHANGE UP (ref 3.5–5.3)
POTASSIUM SERPL-SCNC: 4.3 MMOL/L — SIGNIFICANT CHANGE UP (ref 3.5–5.3)
RBC # BLD: 4.35 M/UL — SIGNIFICANT CHANGE UP (ref 4.2–5.8)
RBC # FLD: 14.7 % — HIGH (ref 10.3–14.5)
SODIUM SERPL-SCNC: 134 MMOL/L — LOW (ref 135–145)
SPECIMEN SOURCE: SIGNIFICANT CHANGE UP
WBC # BLD: 20.88 K/UL — HIGH (ref 3.8–10.5)
WBC # FLD AUTO: 20.88 K/UL — HIGH (ref 3.8–10.5)

## 2019-11-14 PROCEDURE — 51702 INSERT TEMP BLADDER CATH: CPT

## 2019-11-14 PROCEDURE — 99233 SBSQ HOSP IP/OBS HIGH 50: CPT

## 2019-11-14 RX ORDER — TAMSULOSIN HYDROCHLORIDE 0.4 MG/1
0.4 CAPSULE ORAL AT BEDTIME
Refills: 0 | Status: DISCONTINUED | OUTPATIENT
Start: 2019-11-14 | End: 2019-11-19

## 2019-11-14 RX ORDER — HYDRALAZINE HCL 50 MG
10 TABLET ORAL ONCE
Refills: 0 | Status: COMPLETED | OUTPATIENT
Start: 2019-11-14 | End: 2019-11-14

## 2019-11-14 RX ORDER — TAMSULOSIN HYDROCHLORIDE 0.4 MG/1
0.4 CAPSULE ORAL ONCE
Refills: 0 | Status: COMPLETED | OUTPATIENT
Start: 2019-11-14 | End: 2019-11-14

## 2019-11-14 RX ORDER — SODIUM CHLORIDE 9 MG/ML
1000 INJECTION INTRAMUSCULAR; INTRAVENOUS; SUBCUTANEOUS
Refills: 0 | Status: DISCONTINUED | OUTPATIENT
Start: 2019-11-14 | End: 2019-11-15

## 2019-11-14 RX ORDER — AMLODIPINE BESYLATE 2.5 MG/1
5 TABLET ORAL DAILY
Refills: 0 | Status: DISCONTINUED | OUTPATIENT
Start: 2019-11-14 | End: 2019-11-19

## 2019-11-14 RX ORDER — SODIUM CHLORIDE 9 MG/ML
1000 INJECTION, SOLUTION INTRAVENOUS
Refills: 0 | Status: DISCONTINUED | OUTPATIENT
Start: 2019-11-14 | End: 2019-11-14

## 2019-11-14 RX ORDER — LIDOCAINE HCL 20 MG/ML
20 VIAL (ML) INJECTION ONCE
Refills: 0 | Status: COMPLETED | OUTPATIENT
Start: 2019-11-14 | End: 2019-11-14

## 2019-11-14 RX ADMIN — CARVEDILOL PHOSPHATE 12.5 MILLIGRAM(S): 80 CAPSULE, EXTENDED RELEASE ORAL at 07:55

## 2019-11-14 RX ADMIN — HEPARIN SODIUM 5000 UNIT(S): 5000 INJECTION INTRAVENOUS; SUBCUTANEOUS at 22:15

## 2019-11-14 RX ADMIN — SIMVASTATIN 20 MILLIGRAM(S): 20 TABLET, FILM COATED ORAL at 22:15

## 2019-11-14 RX ADMIN — HEPARIN SODIUM 5000 UNIT(S): 5000 INJECTION INTRAVENOUS; SUBCUTANEOUS at 13:09

## 2019-11-14 RX ADMIN — SODIUM CHLORIDE 75 MILLILITER(S): 9 INJECTION INTRAMUSCULAR; INTRAVENOUS; SUBCUTANEOUS at 11:28

## 2019-11-14 RX ADMIN — LISINOPRIL 20 MILLIGRAM(S): 2.5 TABLET ORAL at 05:22

## 2019-11-14 RX ADMIN — SODIUM CHLORIDE 75 MILLILITER(S): 9 INJECTION INTRAMUSCULAR; INTRAVENOUS; SUBCUTANEOUS at 22:16

## 2019-11-14 RX ADMIN — Medication 81 MILLIGRAM(S): at 13:09

## 2019-11-14 RX ADMIN — Medication 1000 MILLIGRAM(S): at 00:15

## 2019-11-14 RX ADMIN — AMLODIPINE BESYLATE 5 MILLIGRAM(S): 2.5 TABLET ORAL at 13:09

## 2019-11-14 RX ADMIN — TAMSULOSIN HYDROCHLORIDE 0.4 MILLIGRAM(S): 0.4 CAPSULE ORAL at 07:55

## 2019-11-14 RX ADMIN — Medication 10 MILLIGRAM(S): at 02:42

## 2019-11-14 RX ADMIN — OXYCODONE HYDROCHLORIDE 5 MILLIGRAM(S): 5 TABLET ORAL at 03:40

## 2019-11-14 RX ADMIN — PANTOPRAZOLE SODIUM 40 MILLIGRAM(S): 20 TABLET, DELAYED RELEASE ORAL at 07:55

## 2019-11-14 RX ADMIN — HEPARIN SODIUM 5000 UNIT(S): 5000 INJECTION INTRAVENOUS; SUBCUTANEOUS at 05:22

## 2019-11-14 RX ADMIN — Medication 400 MILLIGRAM(S): at 05:22

## 2019-11-14 RX ADMIN — Medication 20 MILLILITER(S): at 12:24

## 2019-11-14 RX ADMIN — TAMSULOSIN HYDROCHLORIDE 0.4 MILLIGRAM(S): 0.4 CAPSULE ORAL at 22:15

## 2019-11-14 RX ADMIN — OXYCODONE HYDROCHLORIDE 5 MILLIGRAM(S): 5 TABLET ORAL at 02:42

## 2019-11-14 NOTE — PROGRESS NOTE ADULT - ASSESSMENT
87M s/p R lap partial nephrectomy on 11/1/19, postoperative hematuria with H/H drop, s/p IR selective angioembolization of two lower pole vessels 11/12, was transferred to SICU from IR for closer monitoring.  5U PRBC transfused 11/12 in total. Transferred to the floor in stable condition. H/H now stable    Plan:  --AM labs reviewed, will give gentle IVF  --check PVR  --add flomax  --cont reg diet  --Out of bed, pain control, incentive spirometry, DVT prophylaxis   --continue ASA, per medicine pt no longer requires plavix

## 2019-11-14 NOTE — PROCEDURE NOTE - NSURITECHNIQUE_GU_A_CORE
Sterile gloves were worn for the duration of the procedure/A sterile drape was used to cover all adjacent areas/The catheter was appropriately lubricated/All applicable medical record documentation is completed/The site was cleaned with soap/water and sterile solution (betadine)/The catheter was secured with a securement device (e.g. StatLock)/The urinary drainage system is closed at the end of the procedure/The collection bag is below the level of the patient and urinary bladder

## 2019-11-14 NOTE — PROGRESS NOTE ADULT - SUBJECTIVE AND OBJECTIVE BOX
Overnight events:  Given hydralazine for hypertension    Subjective:  Pt states urinating small amounts without pain, no flank pain, tolerating diet    Objective:    Vital signs  T(C): , Max: 37.4 (11-13-19 @ 22:00)  HR: 106 (11-14-19 @ 04:46)  BP: 182/74 (11-14-19 @ 04:46)  SpO2: 95% (11-14-19 @ 04:46)  Wt(kg): --    Output   Void: 60    11-13 @ 07:01  -  11-14 @ 07:00  --------------------------------------------------------  IN: 225 mL / OUT: 285 mL / NET: -60 mL        Gen: NAD  Abd: incisions c/d/i, resolving ecchymoses midline and right flank/hip, no infection or warmth  Right groin puncture site without hematoma/ecchymoses    Labs                        12.9   20.88 )-----------( 340      ( 14 Nov 2019 05:25 )             38.8     14 Nov 2019 05:25    134    |  102    |  36     ----------------------------<  97     4.3     |  19     |  2.55     Ca    8.2        14 Nov 2019 05:25  Phos  4.0       13 Nov 2019 03:00  Mg     2.2       13 Nov 2019 03:00

## 2019-11-14 NOTE — PROGRESS NOTE ADULT - SUBJECTIVE AND OBJECTIVE BOX
PROGRESS NOTE:     Patient is a 87y old  Male who presents with a chief complaint of Anemia s/p partial nephrectomy (14 Nov 2019 07:48)      SUBJECTIVE / OVERNIGHT EVENTS: Mak removed yesterday, process was very painful. Pt c/o difficulty voiding this AM. Bladder scan showed 300cc in bladder, patient refusing Mak.     ADDITIONAL REVIEW OF SYSTEMS:    MEDICATIONS  (STANDING):  aspirin  chewable 81 milliGRAM(s) Oral daily  carvedilol 12.5 milliGRAM(s) Oral every 12 hours  heparin  Injectable 5000 Unit(s) SubCutaneous every 8 hours  influenza   Vaccine 0.5 milliLiter(s) IntraMuscular once  lidocaine 2% Jelly 20 milliLiter(s) IntraUrethral once  pantoprazole    Tablet 40 milliGRAM(s) Oral before breakfast  simvastatin 20 milliGRAM(s) Oral at bedtime  sodium chloride 0.9%. 1000 milliLiter(s) (75 mL/Hr) IV Continuous <Continuous>  tamsulosin 0.4 milliGRAM(s) Oral at bedtime    MEDICATIONS  (PRN):  acetaminophen   Tablet .. 650 milliGRAM(s) Oral every 6 hours PRN Moderate Pain (4 - 6)  oxyCODONE    IR 5 milliGRAM(s) Oral every 4 hours PRN Severe Pain (7 - 10)  senna 2 Tablet(s) Oral at bedtime PRN Constipation      CAPILLARY BLOOD GLUCOSE        I&O's Summary    13 Nov 2019 07:01  -  14 Nov 2019 07:00  --------------------------------------------------------  IN: 225 mL / OUT: 285 mL / NET: -60 mL    14 Nov 2019 07:01  -  14 Nov 2019 12:00  --------------------------------------------------------  IN: 0 mL / OUT: 0 mL / NET: 0 mL        PHYSICAL EXAM:  Vital Signs Last 24 Hrs  T(C): 36.7 (14 Nov 2019 09:23), Max: 37.4 (13 Nov 2019 22:00)  T(F): 98.1 (14 Nov 2019 09:23), Max: 99.3 (13 Nov 2019 22:00)  HR: 99 (14 Nov 2019 09:23) (82 - 106)  BP: 157/81 (14 Nov 2019 09:23) (142/65 - 197/81)  BP(mean): --  RR: 17 (14 Nov 2019 09:23) (16 - 18)  SpO2: 97% (14 Nov 2019 09:23) (95% - 100%)    CONSTITUTIONAL: NAD  NECK: Supple, no JVD   RESPIRATORY: Normal respiratory effort; lungs are clear to auscultation bilaterally  CARDIOVASCULAR: Regular rate and rhythm, normal S1 and S2, no murmur/rub/gallop; trace b/l lower extremity edema; Peripheral pulses are 2+ bilaterally  ABDOMEN: Nontender to palpation, normoactive bowel sounds, no rebound/guarding; No hepatosplenomegaly  MUSCLOSKELETAL: no clubbing or cyanosis of digits; no joint swelling or tenderness to palpation  PSYCH: A+O to person, place, and time; affect appropriate    LABS:                        12.9   20.88 )-----------( 340      ( 14 Nov 2019 05:25 )             38.8     11-14    134<L>  |  102  |  36<H>  ----------------------------<  97  4.3   |  19<L>  |  2.55<H>    Ca    8.2<L>      14 Nov 2019 05:25  Phos  4.0     11-13  Mg     2.2     11-13    TPro  4.7<L>  /  Alb  2.6<L>  /  TBili  0.4  /  DBili  x   /  AST  16  /  ALT  20  /  AlkPhos  54  11-12    PT/INR - ( 12 Nov 2019 16:30 )   PT: 12.1 SEC;   INR: 1.09          PTT - ( 12 Nov 2019 16:30 )  PTT:24.3 SEC          Culture - Urine (collected 12 Nov 2019 09:43)  Source: .Urine Clean Catch (Midstream)  Preliminary Report (13 Nov 2019 12:29):    10,000 - 49,000 CFU/mL Gram positive organisms    <10,000 CFU/ml Normal Urogenital kasia present        RADIOLOGY & ADDITIONAL TESTS:  Results Reviewed:   Imaging Personally Reviewed:  Electrocardiogram Personally Reviewed:    COORDINATION OF CARE:  Care Discussed with Consultants/Other Providers [Y/N]:  Prior or Outpatient Records Reviewed [Y/N]:

## 2019-11-14 NOTE — PROGRESS NOTE ADULT - PROBLEM SELECTOR PLAN 3
In setting of partial nephrectomy, creatinine now rising likely d/t ATN from contrast exposure and obstructive uropathy    Bladder scan w/ 300cc in bladder, patient refusing Mak, started on Flomax   IV fluids   Hold Lisinopril   Avoid nephrotoxic agents   Monitor Cr.

## 2019-11-14 NOTE — PROGRESS NOTE ADULT - PROBLEM SELECTOR PLAN 5
Uncontrolled BP, discomfort likely contributed   Continue Coreg   Hold Lisinopril d/t worsening WALTER   Will start Norvasc 5mg   Monitor BP.

## 2019-11-15 DIAGNOSIS — A41.9 SEPSIS, UNSPECIFIED ORGANISM: ICD-10-CM

## 2019-11-15 LAB
ANION GAP SERPL CALC-SCNC: 14 MMO/L — SIGNIFICANT CHANGE UP (ref 7–14)
APPEARANCE UR: SIGNIFICANT CHANGE UP
BACTERIA # UR AUTO: SIGNIFICANT CHANGE UP
BILIRUB UR-MCNC: NEGATIVE — SIGNIFICANT CHANGE UP
BLOOD UR QL VISUAL: HIGH
BUN SERPL-MCNC: 40 MG/DL — HIGH (ref 7–23)
CALCIUM SERPL-MCNC: 8.4 MG/DL — SIGNIFICANT CHANGE UP (ref 8.4–10.5)
CHLORIDE SERPL-SCNC: 107 MMOL/L — SIGNIFICANT CHANGE UP (ref 98–107)
CO2 SERPL-SCNC: 18 MMOL/L — LOW (ref 22–31)
COLOR SPEC: SIGNIFICANT CHANGE UP
CREAT SERPL-MCNC: 2.17 MG/DL — HIGH (ref 0.5–1.3)
GLUCOSE SERPL-MCNC: 106 MG/DL — HIGH (ref 70–99)
GLUCOSE UR-MCNC: NEGATIVE — SIGNIFICANT CHANGE UP
HCT VFR BLD CALC: 39.5 % — SIGNIFICANT CHANGE UP (ref 39–50)
HGB BLD-MCNC: 12.9 G/DL — LOW (ref 13–17)
KETONES UR-MCNC: SIGNIFICANT CHANGE UP
LEUKOCYTE ESTERASE UR-ACNC: SIGNIFICANT CHANGE UP
MCHC RBC-ENTMCNC: 29.5 PG — SIGNIFICANT CHANGE UP (ref 27–34)
MCHC RBC-ENTMCNC: 32.7 % — SIGNIFICANT CHANGE UP (ref 32–36)
MCV RBC AUTO: 90.2 FL — SIGNIFICANT CHANGE UP (ref 80–100)
METHOD TYPE: SIGNIFICANT CHANGE UP
NITRITE UR-MCNC: NEGATIVE — SIGNIFICANT CHANGE UP
NRBC # FLD: 0 K/UL — SIGNIFICANT CHANGE UP (ref 0–0)
ORGANISM # SPEC MICROSCOPIC CNT: SIGNIFICANT CHANGE UP
ORGANISM # SPEC MICROSCOPIC CNT: SIGNIFICANT CHANGE UP
PH UR: 6 — SIGNIFICANT CHANGE UP (ref 5–8)
PLATELET # BLD AUTO: 276 K/UL — SIGNIFICANT CHANGE UP (ref 150–400)
PMV BLD: 9 FL — SIGNIFICANT CHANGE UP (ref 7–13)
POTASSIUM SERPL-MCNC: 4.4 MMOL/L — SIGNIFICANT CHANGE UP (ref 3.5–5.3)
POTASSIUM SERPL-SCNC: 4.4 MMOL/L — SIGNIFICANT CHANGE UP (ref 3.5–5.3)
PROT UR-MCNC: 100 — HIGH
RBC # BLD: 4.38 M/UL — SIGNIFICANT CHANGE UP (ref 4.2–5.8)
RBC # FLD: 14.7 % — HIGH (ref 10.3–14.5)
RBC CASTS # UR COMP ASSIST: >50 — HIGH (ref 0–?)
SODIUM SERPL-SCNC: 139 MMOL/L — SIGNIFICANT CHANGE UP (ref 135–145)
SP GR SPEC: 1.02 — SIGNIFICANT CHANGE UP (ref 1–1.04)
SPECIMEN SOURCE: SIGNIFICANT CHANGE UP
SPECIMEN SOURCE: SIGNIFICANT CHANGE UP
SQUAMOUS # UR AUTO: SIGNIFICANT CHANGE UP
UROBILINOGEN FLD QL: NORMAL — SIGNIFICANT CHANGE UP
WBC # BLD: 8.21 K/UL — SIGNIFICANT CHANGE UP (ref 3.8–10.5)
WBC # FLD AUTO: 8.21 K/UL — SIGNIFICANT CHANGE UP (ref 3.8–10.5)
WBC UR QL: >50 — HIGH (ref 0–?)

## 2019-11-15 PROCEDURE — 71250 CT THORAX DX C-: CPT | Mod: 26

## 2019-11-15 PROCEDURE — 74176 CT ABD & PELVIS W/O CONTRAST: CPT | Mod: 26

## 2019-11-15 PROCEDURE — 99233 SBSQ HOSP IP/OBS HIGH 50: CPT

## 2019-11-15 RX ORDER — SODIUM CHLORIDE 9 MG/ML
1000 INJECTION, SOLUTION INTRAVENOUS
Refills: 0 | Status: DISCONTINUED | OUTPATIENT
Start: 2019-11-15 | End: 2019-11-16

## 2019-11-15 RX ORDER — PIPERACILLIN AND TAZOBACTAM 4; .5 G/20ML; G/20ML
3.38 INJECTION, POWDER, LYOPHILIZED, FOR SOLUTION INTRAVENOUS ONCE
Refills: 0 | Status: COMPLETED | OUTPATIENT
Start: 2019-11-15 | End: 2019-11-15

## 2019-11-15 RX ORDER — PIPERACILLIN AND TAZOBACTAM 4; .5 G/20ML; G/20ML
3.38 INJECTION, POWDER, LYOPHILIZED, FOR SOLUTION INTRAVENOUS EVERY 12 HOURS
Refills: 0 | Status: DISCONTINUED | OUTPATIENT
Start: 2019-11-15 | End: 2019-11-19

## 2019-11-15 RX ORDER — ACETAMINOPHEN 500 MG
325 TABLET ORAL ONCE
Refills: 0 | Status: COMPLETED | OUTPATIENT
Start: 2019-11-15 | End: 2019-11-15

## 2019-11-15 RX ORDER — ACETAMINOPHEN 500 MG
650 TABLET ORAL EVERY 6 HOURS
Refills: 0 | Status: DISCONTINUED | OUTPATIENT
Start: 2019-11-15 | End: 2019-11-15

## 2019-11-15 RX ORDER — ACETAMINOPHEN 500 MG
650 TABLET ORAL EVERY 6 HOURS
Refills: 0 | Status: DISCONTINUED | OUTPATIENT
Start: 2019-11-15 | End: 2019-11-19

## 2019-11-15 RX ORDER — LIDOCAINE 4 G/100G
1 CREAM TOPICAL EVERY 4 HOURS
Refills: 0 | Status: DISCONTINUED | OUTPATIENT
Start: 2019-11-15 | End: 2019-11-19

## 2019-11-15 RX ORDER — OXYBUTYNIN CHLORIDE 5 MG
5 TABLET ORAL EVERY 8 HOURS
Refills: 0 | Status: DISCONTINUED | OUTPATIENT
Start: 2019-11-15 | End: 2019-11-15

## 2019-11-15 RX ADMIN — Medication 650 MILLIGRAM(S): at 04:50

## 2019-11-15 RX ADMIN — HEPARIN SODIUM 5000 UNIT(S): 5000 INJECTION INTRAVENOUS; SUBCUTANEOUS at 21:17

## 2019-11-15 RX ADMIN — SODIUM CHLORIDE 100 MILLILITER(S): 9 INJECTION, SOLUTION INTRAVENOUS at 08:24

## 2019-11-15 RX ADMIN — CARVEDILOL PHOSPHATE 12.5 MILLIGRAM(S): 80 CAPSULE, EXTENDED RELEASE ORAL at 21:17

## 2019-11-15 RX ADMIN — AMLODIPINE BESYLATE 5 MILLIGRAM(S): 2.5 TABLET ORAL at 06:07

## 2019-11-15 RX ADMIN — Medication 5 MILLIGRAM(S): at 15:20

## 2019-11-15 RX ADMIN — Medication 650 MILLIGRAM(S): at 03:52

## 2019-11-15 RX ADMIN — HEPARIN SODIUM 5000 UNIT(S): 5000 INJECTION INTRAVENOUS; SUBCUTANEOUS at 13:33

## 2019-11-15 RX ADMIN — TAMSULOSIN HYDROCHLORIDE 0.4 MILLIGRAM(S): 0.4 CAPSULE ORAL at 21:17

## 2019-11-15 RX ADMIN — Medication 650 MILLIGRAM(S): at 15:20

## 2019-11-15 RX ADMIN — SIMVASTATIN 20 MILLIGRAM(S): 20 TABLET, FILM COATED ORAL at 21:16

## 2019-11-15 RX ADMIN — Medication 325 MILLIGRAM(S): at 06:47

## 2019-11-15 RX ADMIN — PANTOPRAZOLE SODIUM 40 MILLIGRAM(S): 20 TABLET, DELAYED RELEASE ORAL at 06:07

## 2019-11-15 RX ADMIN — Medication 81 MILLIGRAM(S): at 13:33

## 2019-11-15 RX ADMIN — PIPERACILLIN AND TAZOBACTAM 200 GRAM(S): 4; .5 INJECTION, POWDER, LYOPHILIZED, FOR SOLUTION INTRAVENOUS at 06:45

## 2019-11-15 RX ADMIN — PIPERACILLIN AND TAZOBACTAM 25 GRAM(S): 4; .5 INJECTION, POWDER, LYOPHILIZED, FOR SOLUTION INTRAVENOUS at 17:21

## 2019-11-15 RX ADMIN — HEPARIN SODIUM 5000 UNIT(S): 5000 INJECTION INTRAVENOUS; SUBCUTANEOUS at 06:07

## 2019-11-15 RX ADMIN — SODIUM CHLORIDE 100 MILLILITER(S): 9 INJECTION, SOLUTION INTRAVENOUS at 21:17

## 2019-11-15 RX ADMIN — Medication 325 MILLIGRAM(S): at 06:07

## 2019-11-15 NOTE — PROVIDER CONTACT NOTE (OTHER) - SITUATION
Pt. c/o chills and rigor, given warm blanket, checked oral temp 98.4, /124 and , notified provider. Ordered rectal temp and manual BP and HR. Manual /90, HR:132/min, RR :22.

## 2019-11-15 NOTE — PROVIDER CONTACT NOTE (OTHER) - ACTION/TREATMENT ORDERED:
SHERLY Zavala came to bedside, ordered manual BP, HR, RR, rectal temp, BC X 2, UA, Urine culture. Tylenol 325mg after BC done. Ordered Zosyn. Order carried out.

## 2019-11-15 NOTE — PROGRESS NOTE ADULT - ASSESSMENT
87M s/p R lap partial nephrectomy on 11/1/19, postoperative hematuria with H/H drop, s/p IR selective angioembolization of two lower pole vessels 11/12, was transferred to SICU from IR for closer monitoring.  5U PRBC transfused 11/12 in total. Transferred to the floor in stable condition. Failed TOV 11/14, mckeon replaced, temp to 103.5 on 11/15, cultures sent, zosyn started, culture from OSH now w/ E faecalis    Plan:  -AM labs reviewed  -zosyn  -f/u cultures  -monitor VS  -CT chest/abd/pelvis  -continue mckeon, flomax  -PT consult  -f/u medicine  -continue ASA, per medicine pt no longer requires plavix  -out of bed with assistance, incentive spirometry, DVT prophylaxis

## 2019-11-15 NOTE — PHYSICAL THERAPY INITIAL EVALUATION ADULT - PATIENT PROFILE REVIEW, REHAB EVAL
Pt. profile reviewed, consulted with RN Mona sultana. prior to initial PT evaluation and tx, as per RN, Pt. is OK to participate in skilled therapy session/yes

## 2019-11-15 NOTE — PROGRESS NOTE ADULT - PROBLEM SELECTOR PLAN 6
Uncontrolled at times, discomfort likely contributing to elevated BP's   Continue Coreg, started Norvasc 11/14  Hold Lisinopril d/t worsening WALTER   Monitor BP.

## 2019-11-15 NOTE — PROGRESS NOTE ADULT - PROBLEM SELECTOR PLAN 4
In setting of recent partial nephrectomy, creatinine up-trended likely d/t ATN from contrast exposure and obstructive uropathy, now improving     Bladder scan w/ 300cc in bladder, Mak reinserted and started on Flomax   c/w IV fluids   Hold Lisinopril   Avoid nephrotoxic agents   Monitor Cr.

## 2019-11-15 NOTE — PROGRESS NOTE ADULT - SUBJECTIVE AND OBJECTIVE BOX
PROGRESS NOTE:     Patient is a 87y old  Male who presents with a chief complaint of Anemia s/p partial nephrectomy (15 Nov 2019 07:56)      SUBJECTIVE / OVERNIGHT EVENTS: Pt c/o discomfort from Mak catheter. Had fever and chills last night.     ADDITIONAL REVIEW OF SYSTEMS:    MEDICATIONS  (STANDING):  amLODIPine   Tablet 5 milliGRAM(s) Oral daily  aspirin  chewable 81 milliGRAM(s) Oral daily  carvedilol 12.5 milliGRAM(s) Oral every 12 hours  heparin  Injectable 5000 Unit(s) SubCutaneous every 8 hours  influenza   Vaccine 0.5 milliLiter(s) IntraMuscular once  lactated ringers. 1000 milliLiter(s) (100 mL/Hr) IV Continuous <Continuous>  pantoprazole    Tablet 40 milliGRAM(s) Oral before breakfast  piperacillin/tazobactam IVPB.. 3.375 Gram(s) IV Intermittent every 12 hours  simvastatin 20 milliGRAM(s) Oral at bedtime  tamsulosin 0.4 milliGRAM(s) Oral at bedtime    MEDICATIONS  (PRN):  acetaminophen   Tablet .. 650 milliGRAM(s) Oral every 6 hours PRN Moderate Pain (4 - 6)  oxyCODONE    IR 5 milliGRAM(s) Oral every 4 hours PRN Severe Pain (7 - 10)  senna 2 Tablet(s) Oral at bedtime PRN Constipation      CAPILLARY BLOOD GLUCOSE        I&O's Summary    2019 07:  -  15 Nov 2019 07:00  --------------------------------------------------------  IN: 0 mL / OUT: 1730 mL / NET: -1730 mL    15 Nov 2019 07:01  -  15 Nov 2019 12:04  --------------------------------------------------------  IN: 0 mL / OUT: 150 mL / NET: -150 mL        PHYSICAL EXAM:  Vital Signs Last 24 Hrs  T(C): 37.5 (15 Nov 2019 09:12), Max: 39.7 (15 Nov 2019 05:30)  T(F): 99.5 (15 Nov 2019 09:12), Max: 103.4 (15 Nov 2019 05:30)  HR: 91 (15 Nov 2019 09:12) (81 - 132)  BP: 108/55 (15 Nov 2019 09:12) (107/45 - 190/90)  BP(mean): --  RR: 18 (15 Nov 2019 09:12) (16 - 22)  SpO2: 94% (15 Nov 2019 09:12) (94% - 98%)    CONSTITUTIONAL: NAD  NECK: Supple, no JVD   RESPIRATORY: Normal respiratory effort; lungs are clear to auscultation bilaterally  CARDIOVASCULAR: Tachycardia, normal S1 and S2, no murmur/rub/gallop; trace b/l lower extremity edema; Peripheral pulses are 2+ bilaterally  ABDOMEN: Nontender to palpation, normoactive bowel sounds, no rebound/guarding; No hepatosplenomegaly  MUSCLOSKELETAL: no clubbing or cyanosis of digits; no joint swelling or tenderness to palpation  : Mak w/ red urine   PSYCH: A+O to person, place, and time; affect appropriate    LABS:                        12.9   8.21  )-----------( 276      ( 15 Nov 2019 05:45 )             39.5     11-15    139  |  107  |  40<H>  ----------------------------<  106<H>  4.4   |  18<L>  |  2.17<H>    Ca    8.4      15 Nov 2019 05:45            Urinalysis Basic - ( 15 Nov 2019 05:45 )    Color: DARK BROWN / Appearance: TURBID / S.020 / pH: 6.0  Gluc: NEGATIVE / Ketone: SMALL  / Bili: NEGATIVE / Urobili: NORMAL   Blood: LARGE / Protein: 100 / Nitrite: NEGATIVE   Leuk Esterase: TRACE / RBC: >50 / WBC >50   Sq Epi: OCC / Non Sq Epi: x / Bacteria: FEW          RADIOLOGY & ADDITIONAL TESTS:  Results Reviewed:   Imaging Personally Reviewed:  Electrocardiogram Personally Reviewed:    COORDINATION OF CARE:  Care Discussed with Consultants/Other Providers [Y/N]:  Prior or Outpatient Records Reviewed [Y/N]:

## 2019-11-15 NOTE — PROVIDER CONTACT NOTE (CRITICAL VALUE NOTIFICATION) - BACKGROUND
Patient s/p R lap partial nephrectomy from 11/1 and d/c home, now a transfer from [facility] for hematuria and leukocytosis

## 2019-11-15 NOTE — PHYSICAL THERAPY INITIAL EVALUATION ADULT - ADDITIONAL COMMENTS
Pt. left supine in hallway on stretcher with transport with all tubes/lines intact and in NAD. RN aware.

## 2019-11-15 NOTE — PHYSICAL THERAPY INITIAL EVALUATION ADULT - PERTINENT HX OF CURRENT PROBLEM, REHAB EVAL
Pt. is a 87 year old male admitted to [facility] due to s/p partial nephrectomy. PMH: CAD, HTN, cancer of kidney

## 2019-11-15 NOTE — PROVIDER CONTACT NOTE (OTHER) - BACKGROUND
Pt. s/p right renal angiogram and embolization post gross hematuria. Mak placed on11/14/19 for urinary retention.

## 2019-11-15 NOTE — PROGRESS NOTE ADULT - SUBJECTIVE AND OBJECTIVE BOX
Overnight events:  Pt failed TOV yesterday, mckeon placed, old blood, irrigated to clear, spiked to 103.5 this AM with stable BP, cultures sent,  Ucx from OSH E faecalis, zosyn started    Subjective:  Pt denies pain, SOB, CP, abdominal/flank pain, no N/V    Objective:    Vital signs  T(C): , Max: 39.7 (11-15-19 @ 05:30)  HR: 95 (11-15-19 @ 07:38)  BP: 107/45 (11-15-19 @ 07:38)  SpO2: 95% (11-15-19 @ 07:38)  Wt(kg): --    Output   Mckeon: 930 tad colored  11-14 @ 07:01  -  11-15 @ 07:00  --------------------------------------------------------  IN: 0 mL / OUT: 1730 mL / NET: -1730 mL        Gen: NAD  Abd: soft, nontender, no CVAT  : mckeon secured    Labs                        12.9   8.21  )-----------( 276      ( 15 Nov 2019 05:45 )             39.5     15 Nov 2019 05:45    139    |  107    |  40     ----------------------------<  106    4.4     |  18     |  2.17     Ca    8.4        15 Nov 2019 05:45      Urine Cx:   Culture - Urine (11.12.19 @ 09:43)    -  Ampicillin: S <=2 Predicts results to ampicillin/sulbactam, amoxacillin-clavulanate and  piperacillin-tazobactam.    -  Ciprofloxacin: S <=1    -  Levofloxacin: S <=1    -  Nitrofurantoin: S <=32 Should not be used to treat pyelonephritis.    -  Tetra/Doxy: R >8    -  Vancomycin: S 1    Specimen Source: .Urine Clean Catch (Midstream)    Culture Results:   10,000 - 49,000 CFU/mL Enterococcus faecalis  <10,000 CFU/ml Normal Urogenital kasia present    Organism Identification: Enterococcus faecalis    Organism: Enterococcus faecalis    Method Type: MIRNA      Blood Cx: P    Imaging: P

## 2019-11-16 LAB
ANION GAP SERPL CALC-SCNC: 11 MMO/L — SIGNIFICANT CHANGE UP (ref 7–14)
BLD GP AB SCN SERPL QL: NEGATIVE — SIGNIFICANT CHANGE UP
BUN SERPL-MCNC: 36 MG/DL — HIGH (ref 7–23)
CALCIUM SERPL-MCNC: 7.9 MG/DL — LOW (ref 8.4–10.5)
CHLORIDE SERPL-SCNC: 108 MMOL/L — HIGH (ref 98–107)
CO2 SERPL-SCNC: 20 MMOL/L — LOW (ref 22–31)
CREAT SERPL-MCNC: 1.84 MG/DL — HIGH (ref 0.5–1.3)
GLUCOSE SERPL-MCNC: 101 MG/DL — HIGH (ref 70–99)
HCT VFR BLD CALC: 32.6 % — LOW (ref 39–50)
HGB BLD-MCNC: 10.6 G/DL — LOW (ref 13–17)
MCHC RBC-ENTMCNC: 29.2 PG — SIGNIFICANT CHANGE UP (ref 27–34)
MCHC RBC-ENTMCNC: 32.5 % — SIGNIFICANT CHANGE UP (ref 32–36)
MCV RBC AUTO: 89.8 FL — SIGNIFICANT CHANGE UP (ref 80–100)
METHOD TYPE: SIGNIFICANT CHANGE UP
METHOD TYPE: SIGNIFICANT CHANGE UP
NRBC # FLD: 0 K/UL — SIGNIFICANT CHANGE UP (ref 0–0)
ORGANISM # SPEC MICROSCOPIC CNT: SIGNIFICANT CHANGE UP
PLATELET # BLD AUTO: 250 K/UL — SIGNIFICANT CHANGE UP (ref 150–400)
PMV BLD: 9.2 FL — SIGNIFICANT CHANGE UP (ref 7–13)
POTASSIUM SERPL-MCNC: 4 MMOL/L — SIGNIFICANT CHANGE UP (ref 3.5–5.3)
POTASSIUM SERPL-SCNC: 4 MMOL/L — SIGNIFICANT CHANGE UP (ref 3.5–5.3)
RBC # BLD: 3.63 M/UL — LOW (ref 4.2–5.8)
RBC # FLD: 14.9 % — HIGH (ref 10.3–14.5)
RH IG SCN BLD-IMP: POSITIVE — SIGNIFICANT CHANGE UP
SODIUM SERPL-SCNC: 139 MMOL/L — SIGNIFICANT CHANGE UP (ref 135–145)
SPECIMEN SOURCE: SIGNIFICANT CHANGE UP
WBC # BLD: 8.24 K/UL — SIGNIFICANT CHANGE UP (ref 3.8–10.5)
WBC # FLD AUTO: 8.24 K/UL — SIGNIFICANT CHANGE UP (ref 3.8–10.5)

## 2019-11-16 PROCEDURE — 99233 SBSQ HOSP IP/OBS HIGH 50: CPT

## 2019-11-16 RX ORDER — LACTOBACILLUS ACIDOPHILUS 100MM CELL
1 CAPSULE ORAL
Refills: 0 | Status: DISCONTINUED | OUTPATIENT
Start: 2019-11-16 | End: 2019-11-19

## 2019-11-16 RX ADMIN — Medication 81 MILLIGRAM(S): at 13:06

## 2019-11-16 RX ADMIN — SODIUM CHLORIDE 100 MILLILITER(S): 9 INJECTION, SOLUTION INTRAVENOUS at 05:34

## 2019-11-16 RX ADMIN — Medication 1 TABLET(S): at 17:31

## 2019-11-16 RX ADMIN — SIMVASTATIN 20 MILLIGRAM(S): 20 TABLET, FILM COATED ORAL at 23:04

## 2019-11-16 RX ADMIN — PIPERACILLIN AND TAZOBACTAM 25 GRAM(S): 4; .5 INJECTION, POWDER, LYOPHILIZED, FOR SOLUTION INTRAVENOUS at 17:31

## 2019-11-16 RX ADMIN — CARVEDILOL PHOSPHATE 12.5 MILLIGRAM(S): 80 CAPSULE, EXTENDED RELEASE ORAL at 23:04

## 2019-11-16 RX ADMIN — CARVEDILOL PHOSPHATE 12.5 MILLIGRAM(S): 80 CAPSULE, EXTENDED RELEASE ORAL at 13:06

## 2019-11-16 RX ADMIN — SODIUM CHLORIDE 100 MILLILITER(S): 9 INJECTION, SOLUTION INTRAVENOUS at 17:32

## 2019-11-16 RX ADMIN — PIPERACILLIN AND TAZOBACTAM 25 GRAM(S): 4; .5 INJECTION, POWDER, LYOPHILIZED, FOR SOLUTION INTRAVENOUS at 05:34

## 2019-11-16 RX ADMIN — HEPARIN SODIUM 5000 UNIT(S): 5000 INJECTION INTRAVENOUS; SUBCUTANEOUS at 13:08

## 2019-11-16 RX ADMIN — TAMSULOSIN HYDROCHLORIDE 0.4 MILLIGRAM(S): 0.4 CAPSULE ORAL at 23:04

## 2019-11-16 RX ADMIN — HEPARIN SODIUM 5000 UNIT(S): 5000 INJECTION INTRAVENOUS; SUBCUTANEOUS at 05:34

## 2019-11-16 RX ADMIN — PANTOPRAZOLE SODIUM 40 MILLIGRAM(S): 20 TABLET, DELAYED RELEASE ORAL at 05:34

## 2019-11-16 RX ADMIN — AMLODIPINE BESYLATE 5 MILLIGRAM(S): 2.5 TABLET ORAL at 05:34

## 2019-11-16 RX ADMIN — HEPARIN SODIUM 5000 UNIT(S): 5000 INJECTION INTRAVENOUS; SUBCUTANEOUS at 23:04

## 2019-11-16 NOTE — PROGRESS NOTE ADULT - ATTENDING COMMENTS
On call attending covering for Dr Navarro. Pt seen and examined. Agree with above and edited as appropriate. Plan to irrigate bladder as needed. Urine appears as old blood. Cont abx and f/u cx.

## 2019-11-16 NOTE — PROGRESS NOTE ADULT - ASSESSMENT
87M s/p R lap partial nephrectomy on 11/1/19, postoperative hematuria with H/H drop, s/p IR selective angioembolization of two lower pole vessels 11/12, was transferred to SICU from IR for closer monitoring.  5U PRBC transfused 11/12 in total. Transferred to the floor in stable condition. Failed TOV 11/14, mckeon replaced, temp to 103.5 on 11/15, cultures sent, zosyn started, culture from OSH now w/ E faecalis, CT cystitis, clot in bladder, irrigated no clot able to be evacuated (probably organized), Bcx Ecoli/enterococcus, repeat Ucx pending, remained afebrile overnight    Plan:  -AM labs reviewed  -cont zosyn  -f/u blood and urine cultures  -monitor VS  -continue mckeon, flomax  -PT consult  recs home PT  -f/u medicine  -continue ASA, per medicine pt no longer requires plavix  -out of bed with assistance, incentive spirometry, DVT prophylaxis

## 2019-11-16 NOTE — PROVIDER CONTACT NOTE (CRITICAL VALUE NOTIFICATION) - BACKGROUND
Patient s/p R lap partial nephrectomy from 11/1 and d/c home, now a transfer from [facility] for hematuria and leukocytosis

## 2019-11-16 NOTE — PROGRESS NOTE ADULT - SUBJECTIVE AND OBJECTIVE BOX
Overnight events:  Fever to 101.3 at 3pm with stable BP, otherwise remained afebrile overnight, Bcx positive Ecoli/Enterococcus, irrigated overnight, no clots evacuated    Subjective:  Pt offers no complaints this am, slept last night    Objective:    Vital signs  T(C): , Max: 38.5 (11-15-19 @ 15:07)  HR: 79 (19 @ 05:35)  BP: 144/66 (19 @ 05:35)  SpO2: 98% (19 @ 05:35)  Wt(kg): --    Output   Mak: 675 light brown, old blood  11-15 @ 07:01  -   @ 07:00  --------------------------------------------------------  IN: 0 mL / OUT: 1225 mL / NET: -1225 mL        Gen: NAD  Abd: resolving right flank ecchymoses, incisions c/d/i, soft, nontender  : mike secured    Labs                        10.6   8.24  )-----------( 250      ( 2019 06:19 )             32.6     2019 06:19    139    |  108    |  36     ----------------------------<  101    4.0     |  20     |  1.84     Ca    7.9        2019 06:19        Culture - Blood (11.15. @ 06:13)    -  Multidrug (KPC pos) resistant organism: - NOTDETECT MIRNA    -  Staphylococcus aureus: - NOTDETECT MIRNA    -  Methicillin resistant Staphylococcus aureus (MRSA): - NOTDETECT MIRNA    -  Coagulase negative Staphylococcus: - NOTDETECT MIRNA    -  Enterococcus species: - NOTDETECT MIRNA    -  Enterococcus species: + DETECT MIRNA    -  Vancomycin resistant Enterococcus sp.: - NOTDETECT MIRNA    -  Escherichia coli: - NOTDETECT MIRNA    -  Klebsiella oxytoca: - NOTDETECT MIRNA    -  Klebsiella pneumoniae: - NOTDETECT MIRNA    -  Serratia marcescens: - NOTDETECT MIRNA    -  Haemophilus influenzae: - NOTDETECT MIRNA    -  Listeria monocytogenes: - NOTDETECT MIRNA    -  Neisseria meningitidis: - NOTDETECT MIRNA    -  Pseudomonas aeruginosa: - NOTDETECT MIRNA    -  Acinetobacter baumanii: - NOTDETECT MIRNA    -  Enterobacter cloacae complex: - NOTDETECT MIRNA    -  Streptococcus sp. (Not Grp A, B or S pneumoniae): - NOTDETECT MIRNA    -  Streptococcus agalactiae (Group B): - NOTDETECT MIRNA    -  Streptococcus pyogenes (Group A): - NOTDETECT MIRNA    -  Streptococcus pneumoniae: - NOTDETECT MIRNA    -  Candida albicans: - NOTDETECT MIRNA    -  Candida glabrata: - NOTDETECT MIRNA    -  Bing krusei: - NOTDETECT MIRNA    -  Candida parapsilosis: - NOTDETECT MIRNA    -  Candida tropicalis: - NOTDETECT MIRNA    Culture - Blood:   ***Blood Panel PCR results on this specimen are available  approximately 3 hours after the Gram stain result***  Gram stain, PCR, and/or culture results may not always  correspond due to difference in methodologies  ------------------------------------------------------------  This PCR assay was performed using [employer].  The  following targets are tested for:  Enterococcus, vancomycin  resistant enterococci, Listeria monocytogenes,  coagulase  negative staphylococci, S. aureus, methicillin resistant S.  aureus, Streptococcus agalactiae (Group B), S. pneumoniae,  S. pyogenes (Group A), Acinetobacter baumannii, Enterobacter  cloacae, E. coli, Klebsiella oxytoca, K. pneumoniae, Proteus  sp., Serratia marcescens, Haemophilus influenzae, Neisseria  meningitidis, Pseudomonas aeruginosa, Candida albicans, C.  glabrata, C. krusei, C. parapsilosis, C. tropicalis and the  KPC resistance gene.  **NOTE: Due to technical problems, Proteus sp. will NOT be  reported as part of the BCID paneluntil further notice.    Specimen Source: BLOOD VENOUS    Organism: BLOOD CULTURE PCR 2  ***Blood Panel PCR results on this specimen are available  approximately 3 hours after the Gram stain result***  Gram stain, PCR, and/or culture results may not always  correspond due to difference in methodologies  ------------------------------------------------------------  This PCR assay was performed using [employer].  The  following targets are tested for:  Enterococcus, vancomycin  resistant enterococci, Listeria monocytogenes,  coagulase  negative staphylococci, S. aureus, methicillin resistant S.  aureus, Streptococcus agalactiae (Group B), S. pneumoniae,  S. pyogenes (Group A), Acinetobacter baumannii, Enterobacter  cloacae, E. coli, Klebsiella oxytoca, K. pneumoniae, Proteus  sp., Serratia marcescens, Haemophilus influenzae, Neisseria  meningitidis, Pseudomonas aeruginosa, Candida albicans, C.  glabrata, C. krusei, C. parapsilosis, C. tropicalis and the  KPC resistance gene.  **NOTE: Due to technical problems, Proteus sp. will NOT be  reported as part of the BCID panel until further notice.    Organism: BLOOD CULTURE PCR    Gram Stain Blood:   ***** CRITICAL RESULT *****  PERSON CALLED / READ-BACK: CHEYANNE MIR RN/Y  DATE / TIME CALLED: 11/15/19 210  CALLED BY: JENNIFER HOPKINS  ***** CRITICAL RESULT *****  PERSON CALLED / READ-BACK: CHEYANNE MIR RN/Y   DATE / TIME CALLED: 19 [phone]  CALLED BY: TITUS BLAS                *******************************                * This is an appended result. *                *******************************  A prior result that was reported as final has been changed.  GNR^Gram Neg Rods  AFTER: 12 HOURS INCUBATION  BOTTLE: AEROBIC BOTTLE  GPCPR^Gram Pos Cocci in Pairs  AFTER: 16 HOURS INCUBATION  BOTTLE: ANAEROBIC BOTTLE    Organism Identification: BLOOD CULTURE PCR  BLOOD CULTURE PCR 2    Method Type: PCR    Method Type: PCR  Culture - Blood (11.15.19 @ 06:13)    Culture - Blood:   ***Blood Panel PCR results on this specimen are available  approximately 3 hours after the Gram stain result***  Gram stain, PCR, and/or culture results may not always  correspond due to difference in methodologies  ------------------------------------------------------------  This PCR assay was performed using [employer].  The  following targets are tested for:  Enterococcus, vancomycin  resistant enterococci, Listeria monocytogenes,  coagulase  negative staphylococci, S. aureus, methicillin resistant S.  aureus, Streptococcus agalactiae (Group B), S. pneumoniae,  S. pyogenes (Group A), Acinetobacter baumannii, Enterobacter  cloacae, E. coli, Klebsiella oxytoca, K. pneumoniae, Proteus  sp., Serratia marcescens, Haemophilus influenzae, Neisseria  meningitidis, Pseudomonas aeruginosa, Candida albicans, C.  glabrata, C. krusei, C. parapsilosis, C. tropicalis and the  KPC resistance gene.  **NOTE: Due to technical problems, Proteus sp. will NOT be  reported as part of the BCID paneluntil further notice.    -  Escherichia coli: + DETECT MIRNA    Specimen Source: BLOOD PERIPHERAL    Organism: BLOOD CULTURE PCR    Gram Stain Blood:   ***** CRITICAL RESULT *****  PERSON CALLED / READ-BACK: CHEYANNE MIR RN/Y  DATE / TIME CALLED: 11/15/19 2102  CALLED BY: JENNIFER HOPKINS^Gram Neg Rods  AFTER: 12 HOURS INCUBATION  BOTTLE: AEROBIC BOTTLE    Organism Identification: BLOOD CULTURE PCR    Method Type: PCR    Culture - Urine (19 @ 09:43)    -  Ampicillin: S <=2 Predicts results to ampicillin/sulbactam, amoxacillin-clavulanate and  piperacillin-tazobactam.    -  Ciprofloxacin: S <=1    -  Levofloxacin: S <=1    -  Nitrofurantoin: S <=32 Should not be used to treat pyelonephritis.    -  Tetra/Doxy: R >8    -  Vancomycin: S 1    Specimen Source: .Urine Clean Catch (Midstream)    Culture Results:   10,000 - 49,000 CFU/mL Enterococcus faecalis  <10,000 CFU/ml Normal Urogenital kasia present    Organism Identification: Enterococcus faecalis    Organism: Enterococcus faecalis    Method Type: MIRNA    Repeat Ucx: pending    CT Abdomen and Pelvis No Cont (15.19 @ 09:42) >  IMPRESSION:     Interval development of emphysematous cystitis. Clot and/or contrast the   bladder.    A 2.5 cm right renal subcapsular hematoma is similar in appearance to   prior given differences in technique.    Unchanged appearance of a 2.7 x 1.7 cm mixed soft tissue/fat density   lesion in the soft tissues of the right posterior shoulder, for which   further evaluation with contrast-enhanced MRI of the right shoulder is   recommended.

## 2019-11-16 NOTE — PROGRESS NOTE ADULT - SUBJECTIVE AND OBJECTIVE BOX
Patient is a 87y old  Male who presents with a chief complaint of Anemia s/p partial nephrectomy (2019 07:28)      SUBJECTIVE / OVERNIGHT EVENTS: No overnight event. Pt feels relatively well this morning without complaint.      MEDICATIONS  (STANDING):  amLODIPine   Tablet 5 milliGRAM(s) Oral daily  aspirin  chewable 81 milliGRAM(s) Oral daily  carvedilol 12.5 milliGRAM(s) Oral every 12 hours  heparin  Injectable 5000 Unit(s) SubCutaneous every 8 hours  influenza   Vaccine 0.5 milliLiter(s) IntraMuscular once  lactated ringers. 1000 milliLiter(s) (100 mL/Hr) IV Continuous <Continuous>  pantoprazole    Tablet 40 milliGRAM(s) Oral before breakfast  piperacillin/tazobactam IVPB.. 3.375 Gram(s) IV Intermittent every 12 hours  simvastatin 20 milliGRAM(s) Oral at bedtime  tamsulosin 0.4 milliGRAM(s) Oral at bedtime    MEDICATIONS  (PRN):  acetaminophen   Tablet .. 650 milliGRAM(s) Oral every 6 hours PRN Moderate Pain (4 - 6)  acetaminophen   Tablet .. 650 milliGRAM(s) Oral every 6 hours PRN Temp greater or equal to 38C (100.4F)  lidocaine 2% Gel 1 Application(s) Topical every 4 hours PRN catheter irritation  oxyCODONE    IR 5 milliGRAM(s) Oral every 4 hours PRN Severe Pain (7 - 10)  senna 2 Tablet(s) Oral at bedtime PRN Constipation      CAPILLARY BLOOD GLUCOSE        I&O's Summary    15 Nov 2019 07:  -  2019 07:00  --------------------------------------------------------  IN: 0 mL / OUT: 1225 mL / NET: -1225 mL    2019 07:  -  2019 12:38  --------------------------------------------------------  IN: 0 mL / OUT: 200 mL / NET: -200 mL        PHYSICAL EXAM:  Vital Signs Last 24 Hrs  T(C): 37 (2019 09:45), Max: 38.5 (15 Nov 2019 15:07)  T(F): 98.6 (2019 09:45), Max: 101.3 (15 Nov 2019 15:07)  HR: 87 (2019 09:45) (77 - 95)  BP: 137/64 (2019 09:45) (107/52 - 144/66)  BP(mean): --  RR: 17 (2019 09:45) (16 - 20)  SpO2: 97% (2019 09:45) (95% - 98%)  CONSTITUTIONAL: NAD, well-developed, well-groomed  EYES: PERRLA; conjunctiva and sclera clear  ENMT: Moist oral mucosa, no pharyngeal injection or exudates; normal dentition  RESPIRATORY: Normal respiratory effort; lungs are clear to auscultation bilaterally  CARDIOVASCULAR: Regular rate and rhythm, normal S1 and S2, no murmur/rub/gallop; No lower extremity edema; Peripheral pulses are 2+ bilaterally  ABDOMEN: Nontender to palpation, normoactive bowel sounds, no rebound/guarding; No hepatosplenomegaly  MUSCULOSKELETAL:  Normal gait; no clubbing or cyanosis of digits; no joint swelling or tenderness to palpation    LABS:                        10.6   8.24  )-----------( 250      ( 2019 06:19 )             32.6     11-16    139  |  108<H>  |  36<H>  ----------------------------<  101<H>  4.0   |  20<L>  |  1.84<H>    Ca    7.9<L>      2019 06:19            Urinalysis Basic - ( 15 Nov 2019 05:45 )    Color: DARK BROWN / Appearance: TURBID / S.020 / pH: 6.0  Gluc: NEGATIVE / Ketone: SMALL  / Bili: NEGATIVE / Urobili: NORMAL   Blood: LARGE / Protein: 100 / Nitrite: NEGATIVE   Leuk Esterase: TRACE / RBC: >50 / WBC >50   Sq Epi: OCC / Non Sq Epi: x / Bacteria: FEW        Culture - Urine (collected 15 Nov 2019 10:09)  Source: URINE CATHETER  Preliminary Report (2019 10:22):    EC^Escherichia coli    COLONY COUNT: > = 100,000 CFU/ML    Culture - Blood (collected 15 Nov 2019 06:13)  Source: BLOOD VENOUS  Preliminary Report (2019 01:31):    ***Blood Panel PCR results on this specimen are available    approximately 3 hours after the Gram stain result***    Gram stain, PCR, and/or culture results may not always    correspond due to difference in methodologies    ------------------------------------------------------------    This PCR assay was performed using [employer].  The    following targets are tested for:  Enterococcus, vancomycin    resistant enterococci, Listeria monocytogenes,  coagulase    negative staphylococci, S. aureus, methicillin resistant S.    aureus, Streptococcus agalactiae (Group B), S. pneumoniae,    S. pyogenes (Group A), Acinetobacter baumannii, Enterobacter    cloacae, E. coli, Klebsiella oxytoca, K. pneumoniae, Proteus    sp., Serratia marcescens, Haemophilus influenzae, Neisseria    meningitidis, Pseudomonas aeruginosa, Candida albicans, C.    glabrata, C. krusei, C. parapsilosis, C. tropicalis and the    KPC resistance gene.    **NOTE: Due to technical problems, Proteus sp. will NOT be    reported as part of the BCID paneluntil further notice.  Organism: BLOOD CULTURE PCR  BLOOD CULTURE PCR 2 (2019 03:10)  Organism: BLOOD CULTURE PCR 2  ***Blood Panel PCR results on this specimen are available  approximately 3 hours after the Gram stain result***  Gram stain, PCR, and/or culture results may not always  correspond due to difference in methodologies  ------------------------------------------------------------  This PCR assay was performed using [employer].  The  following targets are tested for:  Enterococcus, vancomycin  resistant enterococci, Listeria monocytogenes,  coagulase  negative staphylococci, S. aureus, methicillin resistant S.  aureus, Streptococcus agalactiae (Group B), S. pneumoniae,  S. pyogenes (Group A), Acinetobacter baumannii, Enterobacter  cloacae, E. coli, Klebsiella oxytoca, K. pneumoniae, Proteus  sp., Serratia marcescens, Haemophilus influenzae, Neisseria  meningitidis, Pseudomonas aeruginosa, Candida albicans, C.  glabrata, C. krusei, C. parapsilosis, C. tropicalis and the  KPC resistance gene.  **NOTE: Due to technical problems, Proteus sp. will NOT be  reported as part of the BCID panel until further notice. (2019 03:10)  Organism: BLOOD CULTURE PCR (2019 01:31)    Culture - Blood (collected 15 Nov 2019 06:13)  Source: BLOOD PERIPHERAL  Preliminary Report (15 Nov 2019 21:08):    ***Blood Panel PCR results on this specimen are available    approximately 3 hours after the Gram stain result***    Gram stain, PCR, and/or culture results may not always    correspond due to difference in methodologies    ------------------------------------------------------------    This PCR assay was performed using [employer].  The    following targets are tested for:  Enterococcus, vancomycin    resistant enterococci, Listeria monocytogenes,  coagulase    negative staphylococci, S. aureus, methicillin resistant S.    aureus, Streptococcus agalactiae (Group B), S. pneumoniae,    S. pyogenes (Group A), Acinetobacter baumannii, Enterobacter    cloacae, E. coli, Klebsiella oxytoca, K. pneumoniae, Proteus    sp., Serratia marcescens, Haemophilus influenzae, Neisseria    meningitidis, Pseudomonas aeruginosa, Candida albicans, C.    glabrata, C. krusei, C. parapsilosis, C. tropicalis and the    KPC resistance gene.    **NOTE: Due to technical problems, Proteus sp. will NOT be    reported as part of the BCID paneluntil further notice.  Organism: BLOOD CULTURE PCR (15 Nov 2019 21:08)  Organism: BLOOD CULTURE PCR (15 Nov 2019 21:08)        RADIOLOGY & ADDITIONAL TESTS:  Results Reviewed:   Imaging Personally Reviewed:  Electrocardiogram Personally Reviewed:    COORDINATION OF CARE:  Care Discussed with Consultants/Other Providers [Y/N]:  Prior or Outpatient Records Reviewed [Y/N]:

## 2019-11-17 LAB
-  AMIKACIN: SIGNIFICANT CHANGE UP
-  AMPICILLIN/SULBACTAM: SIGNIFICANT CHANGE UP
-  AMPICILLIN: SIGNIFICANT CHANGE UP
-  AZTREONAM: SIGNIFICANT CHANGE UP
-  CEFAZOLIN: SIGNIFICANT CHANGE UP
-  CEFEPIME: SIGNIFICANT CHANGE UP
-  CEFOXITIN: SIGNIFICANT CHANGE UP
-  CEFTAZIDIME: SIGNIFICANT CHANGE UP
-  CEFTRIAXONE: SIGNIFICANT CHANGE UP
-  ERTAPENEM: SIGNIFICANT CHANGE UP
-  GENTAMICIN: SIGNIFICANT CHANGE UP
-  IMIPENEM: SIGNIFICANT CHANGE UP
-  LEVOFLOXACIN: SIGNIFICANT CHANGE UP
-  MEROPENEM: SIGNIFICANT CHANGE UP
-  NITROFURANTOIN: SIGNIFICANT CHANGE UP
-  PIPERACILLIN/TAZOBACTAM: SIGNIFICANT CHANGE UP
-  TIGECYCLINE: SIGNIFICANT CHANGE UP
-  TOBRAMYCIN: SIGNIFICANT CHANGE UP
-  TRIMETHOPRIM/SULFAMETHOXAZOLE: SIGNIFICANT CHANGE UP
ANION GAP SERPL CALC-SCNC: 11 MMO/L — SIGNIFICANT CHANGE UP (ref 7–14)
BACTERIA UR CULT: SIGNIFICANT CHANGE UP
BUN SERPL-MCNC: 29 MG/DL — HIGH (ref 7–23)
CALCIUM SERPL-MCNC: 8.3 MG/DL — LOW (ref 8.4–10.5)
CHLORIDE SERPL-SCNC: 108 MMOL/L — HIGH (ref 98–107)
CO2 SERPL-SCNC: 22 MMOL/L — SIGNIFICANT CHANGE UP (ref 22–31)
CREAT SERPL-MCNC: 1.53 MG/DL — HIGH (ref 0.5–1.3)
GLUCOSE SERPL-MCNC: 93 MG/DL — SIGNIFICANT CHANGE UP (ref 70–99)
HCT VFR BLD CALC: 34.2 % — LOW (ref 39–50)
HGB BLD-MCNC: 10.9 G/DL — LOW (ref 13–17)
MCHC RBC-ENTMCNC: 29.1 PG — SIGNIFICANT CHANGE UP (ref 27–34)
MCHC RBC-ENTMCNC: 31.9 % — LOW (ref 32–36)
MCV RBC AUTO: 91.4 FL — SIGNIFICANT CHANGE UP (ref 80–100)
METHOD TYPE: SIGNIFICANT CHANGE UP
NRBC # FLD: 0 K/UL — SIGNIFICANT CHANGE UP (ref 0–0)
ORGANISM # SPEC MICROSCOPIC CNT: SIGNIFICANT CHANGE UP
PLATELET # BLD AUTO: 267 K/UL — SIGNIFICANT CHANGE UP (ref 150–400)
PMV BLD: 9.6 FL — SIGNIFICANT CHANGE UP (ref 7–13)
POTASSIUM SERPL-MCNC: 3.9 MMOL/L — SIGNIFICANT CHANGE UP (ref 3.5–5.3)
POTASSIUM SERPL-SCNC: 3.9 MMOL/L — SIGNIFICANT CHANGE UP (ref 3.5–5.3)
RBC # BLD: 3.74 M/UL — LOW (ref 4.2–5.8)
RBC # FLD: 14.5 % — SIGNIFICANT CHANGE UP (ref 10.3–14.5)
SODIUM SERPL-SCNC: 141 MMOL/L — SIGNIFICANT CHANGE UP (ref 135–145)
WBC # BLD: 6.52 K/UL — SIGNIFICANT CHANGE UP (ref 3.8–10.5)
WBC # FLD AUTO: 6.52 K/UL — SIGNIFICANT CHANGE UP (ref 3.8–10.5)

## 2019-11-17 PROCEDURE — 99233 SBSQ HOSP IP/OBS HIGH 50: CPT

## 2019-11-17 RX ADMIN — CARVEDILOL PHOSPHATE 12.5 MILLIGRAM(S): 80 CAPSULE, EXTENDED RELEASE ORAL at 21:29

## 2019-11-17 RX ADMIN — PANTOPRAZOLE SODIUM 40 MILLIGRAM(S): 20 TABLET, DELAYED RELEASE ORAL at 05:59

## 2019-11-17 RX ADMIN — PIPERACILLIN AND TAZOBACTAM 25 GRAM(S): 4; .5 INJECTION, POWDER, LYOPHILIZED, FOR SOLUTION INTRAVENOUS at 05:58

## 2019-11-17 RX ADMIN — Medication 81 MILLIGRAM(S): at 12:22

## 2019-11-17 RX ADMIN — SIMVASTATIN 20 MILLIGRAM(S): 20 TABLET, FILM COATED ORAL at 21:29

## 2019-11-17 RX ADMIN — Medication 1 TABLET(S): at 18:20

## 2019-11-17 RX ADMIN — AMLODIPINE BESYLATE 5 MILLIGRAM(S): 2.5 TABLET ORAL at 05:58

## 2019-11-17 RX ADMIN — Medication 1 TABLET(S): at 09:42

## 2019-11-17 RX ADMIN — CARVEDILOL PHOSPHATE 12.5 MILLIGRAM(S): 80 CAPSULE, EXTENDED RELEASE ORAL at 12:22

## 2019-11-17 RX ADMIN — PIPERACILLIN AND TAZOBACTAM 25 GRAM(S): 4; .5 INJECTION, POWDER, LYOPHILIZED, FOR SOLUTION INTRAVENOUS at 18:21

## 2019-11-17 RX ADMIN — HEPARIN SODIUM 5000 UNIT(S): 5000 INJECTION INTRAVENOUS; SUBCUTANEOUS at 05:58

## 2019-11-17 RX ADMIN — HEPARIN SODIUM 5000 UNIT(S): 5000 INJECTION INTRAVENOUS; SUBCUTANEOUS at 21:28

## 2019-11-17 RX ADMIN — HEPARIN SODIUM 5000 UNIT(S): 5000 INJECTION INTRAVENOUS; SUBCUTANEOUS at 13:27

## 2019-11-17 RX ADMIN — TAMSULOSIN HYDROCHLORIDE 0.4 MILLIGRAM(S): 0.4 CAPSULE ORAL at 21:28

## 2019-11-17 NOTE — PROGRESS NOTE ADULT - ATTENDING COMMENTS
On call attending covering for Dr Navarro. Pt seen and examined. Agree with above and edited as appropriate. urine color improving. pt with a myriad of complaints about hospitalization and personnel. Upset about anyone coming to see/evaluate him.

## 2019-11-17 NOTE — PROGRESS NOTE ADULT - ASSESSMENT
87M s/p R lap partial nephrectomy on 11/1/19, postoperative hematuria with H/H drop, s/p IR selective angioembolization of two lower pole vessels 11/12, was transferred to SICU from IR for closer monitoring.  5U PRBC transfused 11/12 in total. Transferred to the floor in stable condition. Failed TOV 11/14, mckeon replaced, temp to 103.5 on 11/15, cultures sent, zosyn started, culture from OSH now w/ E faecalis, CT cystitis, clot in bladder, irrigated no clot able to be evacuated (probably organized), Bcx Ecoli/enterococcus, repeat Ucx pending, remained afebrile overnight    Plan:  -AM labs reviewed  -cont zosyn  -repeat blood cultures  -f/u blood and urine cultures  -monitor VS  -continue mckeon, flomax  -PT consult  recs home PT  -f/u medicine  -continue ASA, per medicine pt no longer requires plavix  -out of bed with assistance, incentive spirometry, DVT prophylaxis

## 2019-11-17 NOTE — PROGRESS NOTE ADULT - SUBJECTIVE AND OBJECTIVE BOX
Patient is a 87y old  Male who presents with a chief complaint of Anemia s/p partial nephrectomy (17 Nov 2019 11:10)      SUBJECTIVE / OVERNIGHT EVENTS: Pt complains that generally he is frustrated with his recovery and feels weak.    MEDICATIONS  (STANDING):  amLODIPine   Tablet 5 milliGRAM(s) Oral daily  aspirin  chewable 81 milliGRAM(s) Oral daily  carvedilol 12.5 milliGRAM(s) Oral every 12 hours  heparin  Injectable 5000 Unit(s) SubCutaneous every 8 hours  influenza   Vaccine 0.5 milliLiter(s) IntraMuscular once  lactobacillus acidophilus 1 Tablet(s) Oral two times a day with meals  pantoprazole    Tablet 40 milliGRAM(s) Oral before breakfast  piperacillin/tazobactam IVPB.. 3.375 Gram(s) IV Intermittent every 12 hours  simvastatin 20 milliGRAM(s) Oral at bedtime  tamsulosin 0.4 milliGRAM(s) Oral at bedtime    MEDICATIONS  (PRN):  acetaminophen   Tablet .. 650 milliGRAM(s) Oral every 6 hours PRN Moderate Pain (4 - 6)  acetaminophen   Tablet .. 650 milliGRAM(s) Oral every 6 hours PRN Temp greater or equal to 38C (100.4F)  lidocaine 2% Gel 1 Application(s) Topical every 4 hours PRN catheter irritation  oxyCODONE    IR 5 milliGRAM(s) Oral every 4 hours PRN Severe Pain (7 - 10)      CAPILLARY BLOOD GLUCOSE        I&O's Summary    16 Nov 2019 07:01  -  17 Nov 2019 07:00  --------------------------------------------------------  IN: 0 mL / OUT: 1750 mL / NET: -1750 mL    17 Nov 2019 07:01  -  17 Nov 2019 13:08  --------------------------------------------------------  IN: 300 mL / OUT: 500 mL / NET: -200 mL        PHYSICAL EXAM:  Vital Signs Last 24 Hrs  T(C): 36.7 (17 Nov 2019 10:03), Max: 37.1 (16 Nov 2019 20:38)  T(F): 98.1 (17 Nov 2019 10:03), Max: 98.7 (16 Nov 2019 20:38)  HR: 90 (17 Nov 2019 10:03) (86 - 91)  BP: 167/67 (17 Nov 2019 10:03) (151/63 - 167/67)  BP(mean): --  RR: 17 (17 Nov 2019 10:03) (16 - 17)  SpO2: 99% (17 Nov 2019 10:03) (97% - 99%)  CONSTITUTIONAL: NAD, well-developed, well-groomed  EYES: PERRLA; conjunctiva and sclera clear  ENMT: Moist oral mucosa, no pharyngeal injection or exudates; normal dentition  NECK: Supple, no palpable masses; no thyromegaly  RESPIRATORY: Normal respiratory effort; lungs are clear to auscultation bilaterally  CARDIOVASCULAR: Regular rate and rhythm, normal S1 and S2, no murmur/rub/gallop; No lower extremity edema; Peripheral pulses are 2+ bilaterally  ABDOMEN: Nontender to palpation, normoactive bowel sounds, no rebound/guarding; No hepatosplenomegaly      LABS:                        10.9   6.52  )-----------( 267      ( 17 Nov 2019 06:15 )             34.2     11-17    141  |  108<H>  |  29<H>  ----------------------------<  93  3.9   |  22  |  1.53<H>    Ca    8.3<L>      17 Nov 2019 06:15                Culture - Urine (collected 15 Nov 2019 10:09)  Source: URINE CATHETER  Final Report (17 Nov 2019 12:12):    COLONY COUNT: > = 100,000 CFU/ML  Organism: Escherichia coli (17 Nov 2019 12:12)  Organism: Escherichia coli (17 Nov 2019 12:12)    Culture - Blood (collected 15 Nov 2019 06:13)  Source: BLOOD VENOUS  Preliminary Report (16 Nov 2019 01:31):    ***Blood Panel PCR results on this specimen are available    approximately 3 hours after the Gram stain result***    Gram stain, PCR, and/or culture results may not always    correspond due to difference in methodologies    ------------------------------------------------------------    This PCR assay was performed using [employer].  The    following targets are tested for:  Enterococcus, vancomycin    resistant enterococci, Listeria monocytogenes,  coagulase    negative staphylococci, S. aureus, methicillin resistant S.    aureus, Streptococcus agalactiae (Group B), S. pneumoniae,    S. pyogenes (Group A), Acinetobacter baumannii, Enterobacter    cloacae, E. coli, Klebsiella oxytoca, K. pneumoniae, Proteus    sp., Serratia marcescens, Haemophilus influenzae, Neisseria    meningitidis, Pseudomonas aeruginosa, Candida albicans, C.    glabrata, C. krusei, C. parapsilosis, C. tropicalis and the    KPC resistance gene.    **NOTE: Due to technical problems, Proteus sp. will NOT be    reported as part of the BCID paneluntil further notice.  Organism: BLOOD CULTURE PCR  BLOOD CULTURE PCR 2 (16 Nov 2019 03:10)  Organism: BLOOD CULTURE PCR 2  ***Blood Panel PCR results on this specimen are available  approximately 3 hours after the Gram stain result***  Gram stain, PCR, and/or culture results may not always  correspond due to difference in methodologies  ------------------------------------------------------------  This PCR assay was performed using [employer].  The  following targets are tested for:  Enterococcus, vancomycin  resistant enterococci, Listeria monocytogenes,  coagulase  negative staphylococci, S. aureus, methicillin resistant S.  aureus, Streptococcus agalactiae (Group B), S. pneumoniae,  S. pyogenes (Group A), Acinetobacter baumannii, Enterobacter  cloacae, E. coli, Klebsiella oxytoca, K. pneumoniae, Proteus  sp., Serratia marcescens, Haemophilus influenzae, Neisseria  meningitidis, Pseudomonas aeruginosa, Candida albicans, C.  glabrata, C. krusei, C. parapsilosis, C. tropicalis and the  KPC resistance gene.  **NOTE: Due to technical problems, Proteus sp. will NOT be  reported as part of the BCID panel until further notice. (16 Nov 2019 03:10)  Organism: BLOOD CULTURE PCR (16 Nov 2019 01:31)    Culture - Blood (collected 15 Nov 2019 06:13)  Source: BLOOD PERIPHERAL  Preliminary Report (15 Nov 2019 21:08):    ***Blood Panel PCR results on this specimen are available    approximately 3 hours after the Gram stain result***    Gram stain, PCR, and/or culture results may not always    correspond due to difference in methodologies    ------------------------------------------------------------    This PCR assay was performed using [employer].  The    following targets are tested for:  Enterococcus, vancomycin    resistant enterococci, Listeria monocytogenes,  coagulase    negative staphylococci, S. aureus, methicillin resistant S.    aureus, Streptococcus agalactiae (Group B), S. pneumoniae,    S. pyogenes (Group A), Acinetobacter baumannii, Enterobacter    cloacae, E. coli, Klebsiella oxytoca, K. pneumoniae, Proteus    sp., Serratia marcescens, Haemophilus influenzae, Neisseria    meningitidis, Pseudomonas aeruginosa, Candida albicans, C.    glabrata, C. krusei, C. parapsilosis, C. tropicalis and the    KPC resistance gene.    **NOTE: Due to technical problems, Proteus sp. will NOT be    reported as part of the BCID paneluntil further notice.  Organism: BLOOD CULTURE PCR  Escherichia coli (17 Nov 2019 10:37)  Organism: Escherichia coli (17 Nov 2019 10:37)  Organism: BLOOD CULTURE PCR (15 Nov 2019 21:08)        RADIOLOGY & ADDITIONAL TESTS:  Results Reviewed:   Imaging Personally Reviewed:  Electrocardiogram Personally Reviewed:    COORDINATION OF CARE:  Care Discussed with Consultants/Other Providers [Y/N]:  Prior or Outpatient Records Reviewed [Y/N]:

## 2019-11-18 LAB
-  AMIKACIN: SIGNIFICANT CHANGE UP
-  AMPICILLIN/SULBACTAM: SIGNIFICANT CHANGE UP
-  AMPICILLIN: SIGNIFICANT CHANGE UP
-  AZTREONAM: SIGNIFICANT CHANGE UP
-  CEFAZOLIN: SIGNIFICANT CHANGE UP
-  CEFEPIME: SIGNIFICANT CHANGE UP
-  CEFOXITIN: SIGNIFICANT CHANGE UP
-  CEFTAZIDIME: SIGNIFICANT CHANGE UP
-  CEFTRIAXONE: SIGNIFICANT CHANGE UP
-  ERTAPENEM: SIGNIFICANT CHANGE UP
-  GENTAMICIN: SIGNIFICANT CHANGE UP
-  IMIPENEM: SIGNIFICANT CHANGE UP
-  LEVOFLOXACIN: SIGNIFICANT CHANGE UP
-  MEROPENEM: SIGNIFICANT CHANGE UP
-  PIPERACILLIN/TAZOBACTAM: SIGNIFICANT CHANGE UP
-  TIGECYCLINE: SIGNIFICANT CHANGE UP
-  TOBRAMYCIN: SIGNIFICANT CHANGE UP
-  TRIMETHOPRIM/SULFAMETHOXAZOLE: SIGNIFICANT CHANGE UP
BACTERIA BLD CULT: SIGNIFICANT CHANGE UP
E COLI DNA BLD POS QL NAA+NON-PROBE: SIGNIFICANT CHANGE UP
METHOD TYPE: SIGNIFICANT CHANGE UP
SPECIMEN SOURCE: SIGNIFICANT CHANGE UP
SPECIMEN SOURCE: SIGNIFICANT CHANGE UP

## 2019-11-18 PROCEDURE — 99232 SBSQ HOSP IP/OBS MODERATE 35: CPT

## 2019-11-18 PROCEDURE — 99233 SBSQ HOSP IP/OBS HIGH 50: CPT

## 2019-11-18 RX ORDER — PHENAZOPYRIDINE HCL 100 MG
100 TABLET ORAL EVERY 8 HOURS
Refills: 0 | Status: DISCONTINUED | OUTPATIENT
Start: 2019-11-18 | End: 2019-11-19

## 2019-11-18 RX ORDER — LIDOCAINE HCL 20 MG/ML
20 VIAL (ML) INJECTION ONCE
Refills: 0 | Status: COMPLETED | OUTPATIENT
Start: 2019-11-18 | End: 2019-11-18

## 2019-11-18 RX ORDER — ACETAMINOPHEN 500 MG
1000 TABLET ORAL ONCE
Refills: 0 | Status: COMPLETED | OUTPATIENT
Start: 2019-11-18 | End: 2019-11-18

## 2019-11-18 RX ADMIN — SIMVASTATIN 20 MILLIGRAM(S): 20 TABLET, FILM COATED ORAL at 22:20

## 2019-11-18 RX ADMIN — HEPARIN SODIUM 5000 UNIT(S): 5000 INJECTION INTRAVENOUS; SUBCUTANEOUS at 22:20

## 2019-11-18 RX ADMIN — TAMSULOSIN HYDROCHLORIDE 0.4 MILLIGRAM(S): 0.4 CAPSULE ORAL at 22:20

## 2019-11-18 RX ADMIN — Medication 81 MILLIGRAM(S): at 13:30

## 2019-11-18 RX ADMIN — Medication 100 MILLIGRAM(S): at 09:08

## 2019-11-18 RX ADMIN — Medication 1 TABLET(S): at 07:31

## 2019-11-18 RX ADMIN — HEPARIN SODIUM 5000 UNIT(S): 5000 INJECTION INTRAVENOUS; SUBCUTANEOUS at 05:37

## 2019-11-18 RX ADMIN — Medication 100 MILLIGRAM(S): at 13:30

## 2019-11-18 RX ADMIN — PIPERACILLIN AND TAZOBACTAM 25 GRAM(S): 4; .5 INJECTION, POWDER, LYOPHILIZED, FOR SOLUTION INTRAVENOUS at 05:36

## 2019-11-18 RX ADMIN — CARVEDILOL PHOSPHATE 12.5 MILLIGRAM(S): 80 CAPSULE, EXTENDED RELEASE ORAL at 22:20

## 2019-11-18 RX ADMIN — Medication 400 MILLIGRAM(S): at 09:02

## 2019-11-18 RX ADMIN — Medication 1 TABLET(S): at 17:53

## 2019-11-18 RX ADMIN — Medication 20 MILLILITER(S): at 09:59

## 2019-11-18 RX ADMIN — CARVEDILOL PHOSPHATE 12.5 MILLIGRAM(S): 80 CAPSULE, EXTENDED RELEASE ORAL at 07:31

## 2019-11-18 RX ADMIN — Medication 1000 MILLIGRAM(S): at 10:00

## 2019-11-18 RX ADMIN — PIPERACILLIN AND TAZOBACTAM 25 GRAM(S): 4; .5 INJECTION, POWDER, LYOPHILIZED, FOR SOLUTION INTRAVENOUS at 17:53

## 2019-11-18 RX ADMIN — Medication 100 MILLIGRAM(S): at 22:20

## 2019-11-18 RX ADMIN — HEPARIN SODIUM 5000 UNIT(S): 5000 INJECTION INTRAVENOUS; SUBCUTANEOUS at 13:30

## 2019-11-18 RX ADMIN — AMLODIPINE BESYLATE 5 MILLIGRAM(S): 2.5 TABLET ORAL at 05:41

## 2019-11-18 RX ADMIN — PANTOPRAZOLE SODIUM 40 MILLIGRAM(S): 20 TABLET, DELAYED RELEASE ORAL at 07:31

## 2019-11-18 NOTE — PROGRESS NOTE ADULT - PROBLEM SELECTOR PLAN 4
In setting of recent partial nephrectomy, creatinine up-trended likely d/t ATN from contrast exposure and obstructive uropathy, now improving     Bladder scan w/ 300cc in bladder, Mckeon reinserted and started on Flomax; mckeon out and checking TOV  c/w IV fluids   Hold Lisinopril   Avoid nephrotoxic agents   Monitor Creatinine - downtrending slowly

## 2019-11-18 NOTE — PROGRESS NOTE ADULT - PROBLEM SELECTOR PLAN 3
Sepsis likely d/t UTI   Started on Zosyn   -UCx and BCx from 11/15 growing Escherichia coli + Enterococcus faecalis  -Repeat BCx from 1/17 no growth to date  -c/w zosyn IV day #2/14;

## 2019-11-18 NOTE — PROGRESS NOTE ADULT - SUBJECTIVE AND OBJECTIVE BOX
[facility] Division of Hospital Medicine  Hospitalist: Yin Thrasher MD   Pager: [phone]    CHIEF COMPLAINT: Patient is a 87y old  Male who presents with a chief complaint of Anemia s/p partial nephrectomy (2019 07:43)    SUBJECTIVE / OVERNIGHT EVENTS: Patient seen and examined. No acute events overnight. This morning, patient had large bowel movement c/w diarrhea concerning for CDiff.     ADDITIONAL REVIEW OF SYSTEMS:    MEDICATIONS  (STANDING):  amLODIPine   Tablet 5 milliGRAM(s) Oral daily  aspirin  chewable 81 milliGRAM(s) Oral daily  carvedilol 12.5 milliGRAM(s) Oral every 12 hours  heparin  Injectable 5000 Unit(s) SubCutaneous every 8 hours  influenza   Vaccine 0.5 milliLiter(s) IntraMuscular once  lactobacillus acidophilus 1 Tablet(s) Oral two times a day with meals  pantoprazole    Tablet 40 milliGRAM(s) Oral before breakfast  phenazopyridine 100 milliGRAM(s) Oral every 8 hours  piperacillin/tazobactam IVPB.. 3.375 Gram(s) IV Intermittent every 12 hours  simvastatin 20 milliGRAM(s) Oral at bedtime  tamsulosin 0.4 milliGRAM(s) Oral at bedtime    MEDICATIONS  (PRN):  acetaminophen   Tablet .. 650 milliGRAM(s) Oral every 6 hours PRN Moderate Pain (4 - 6)  acetaminophen   Tablet .. 650 milliGRAM(s) Oral every 6 hours PRN Temp greater or equal to 38C (100.4F)  lidocaine 2% Gel 1 Application(s) Topical every 4 hours PRN catheter irritation  oxyCODONE    IR 5 milliGRAM(s) Oral every 4 hours PRN Severe Pain (7 - 10)      PHYSICAL EXAM:  Vital Signs Last 24 Hrs  T(C): 36.3 (2019 09:26), Max: 37.1 (2019 21:05)  T(F): 97.4 (2019 09:26), Max: 98.7 (2019 21:05)  HR: 90 (2019 10:19) (80 - 100)  BP: 120/60 (2019 10:19) (120/60 - 187/88)  BP(mean): --  RR: 17 (2019 09:26) (16 - 17)  SpO2: 98% (2019 09:26) (97% - 99%)    GENERAL: NAD, well-developed, well-groomed  EYES: PERRLA; conjunctiva and sclera clear  ENMT: Moist oral mucosa, no pharyngeal injection or exudates; normal dentition  NECK: Supple, no palpable masses; no thyromegaly  RESPIRATORY: Normal respiratory effort; lungs are clear to auscultation bilaterally  CARDIOVASCULAR: Regular rate and rhythm, normal S1 and S2, no murmur/rub/gallop;   ABDOMEN: Nontender to palpation, normoactive bowel sounds, no rebound/guarding; No hepatosplenomegaly  EXTREMITIES: No lower extremity edema; Peripheral pulses are 2+ bilaterally    LABS:                        10.9   6.52  )-----------( 267      ( 2019 06:15 )             34.2     11-17    141  |  108<H>  |  29<H>  ----------------------------<  93  3.9   |  22  |  1.53<H>    Ca    8.3<L>      2019 06:15      Culture - Blood (collected 2019 06:31)  Source: BLOOD VENOUS  Preliminary Report (2019 06:31):    NO ORGANISMS ISOLATED    NO ORGANISMS ISOLATED AT 24 HOURS    Culture - Blood (collected 2019 06:31)  Source: BLOOD PERIPHERAL  Preliminary Report (2019 06:31):    NO ORGANISMS ISOLATED    NO ORGANISMS ISOLATED AT 24 HOURS    Culture - Urine (11.15.19 @ 10:09)    -  Gentamicin: S <=1 MIRNA    -  Imipenem: S <=1 MIRNA    -  Levofloxacin: S <=1 MIRNA    -  Meropenem: S <=1 MIRNA    -  Nitrofurantoin: S <=32 MIRNA    -  Piperacillin/Tazobactam: S <=8 MIRNA    -  Tigecycline: S <=1 MIRNA    -  Tobramycin: S <=2 MIRNA    -  Trimethoprim/Sulfamethoxazole: R >2/38 MIRNA    Culture - Urine:   COLONY COUNT: > = 100,000 CFU/ML    -  Amikacin: S <=8 MIRNA    -  Ampicillin: R >16 MIRNA    -  Ampicillin/Sulbactam: R >16/8 MIRNA    -  Aztreonam: S <=4 MIRNA    -  Cefazolin: S <=2 MIRNA    -  Cefepime: S <=2 MIRNA    -  Cefoxitin: S <=4 MIRNA    -  Ceftazidime: S <=1 MIRNA    -  Ceftriaxone: S <=1 MIRNA    -  Ertapenem: S <=0.5 MIRNA    Specimen Source: URINE CATHETER    Organism Identification: Escherichia coli    Organism: Escherichia coli    Method Type: NEGATIVE MIRNA 43    Culture - Blood (11.15.19 @ 06:13)    -  Imipenem: S <=1 MIRNA    -  Levofloxacin: S <=1 MIRNA    -  Gentamicin: S 2 MIRNA    -  Escherichia coli: - NOTDETECT MIRNA    -  Klebsiella oxytoca: - NOTDETECT MIRNA    -  Klebsiella pneumoniae: - NOTDETECT MIRNA    -  Serratia marcescens: - NOTDETECT MIRNA    -  Haemophilus influenzae: - NOTDETECT MIRNA    -  Listeria monocytogenes: - NOTDETECT MIRNA    -  Neisseria meningitidis: - NOTDETECT MIRNA    -  Pseudomonas aeruginosa: - NOTDETECT MIRNA    -  Acinetobacter baumanii: - NOTDETECT MIRNA    -  Enterobacter cloacae complex: - NOTDETECT MIRNA    -  Streptococcus sp. (Not Grp A, B or S pneumoniae): - NOTDETECT MIRNA    -  Streptococcus agalactiae (Group B): - NOTDETECT MIRNA    -  Streptococcus pyogenes (Group A): - NOTDETECT MIRNA    -  Streptococcus pneumoniae: - NOTDETECT MIRNA    -  Candida albicans: - NOTDETECT MIRNA    -  Candida glabrata: - NOTDETECT MIRNA    -  Bing krusei: - NOTDETECT MIRNA    -  Candida parapsilosis: - NOTDETECT MIRNA    -  Candida tropicalis: - NOTDETECT MIRNA    -  Trimethoprim/Sulfamethoxazole: R >2/38 MIRNA    Culture - Blood:   ***Blood Panel PCR results on this specimen are available  approximately 3 hours after the Gram stain result***  Gram stain, PCR, and/or culture results may not always  correspond due to difference in methodologies  ------------------------------------------------------------  This PCR assay was performed using [employer].  The  following targets are tested for:  Enterococcus, vancomycin  resistant enterococci, Listeria monocytogenes,  coagulase  negative staphylococci, S. aureus, methicillin resistant S.  aureus, Streptococcus agalactiae (Group B), S. pneumoniae,  S. pyogenes (Group A), Acinetobacter baumannii, Enterobacter  cloacae, E. coli, Klebsiella oxytoca, K. pneumoniae, Proteus  sp., Serratia marcescens, Haemophilus influenzae, Neisseria  meningitidis, Pseudomonas aeruginosa, Candida albicans, C.  glabrata, C. krusei, C. parapsilosis, C. tropicalis and the  KPC resistance gene.  **NOTE: Due to technical problems, Proteus sp. will NOT be  reported as part of the BCID paneluntil further notice.    -  Multidrug (KPC pos) resistant organism: - NOTDETECT MIRNA    -  Staphylococcus aureus: - NOTDETECT MIRNA    -  Methicillin resistant Staphylococcus aureus (MRSA): - NOTDETECT MIRNA    -  Coagulase negative Staphylococcus: - NOTDETECT MIRNA    -  Enterococcus species: - NOTDETECT MIRNA    -  Enterococcus species: + DETECT MIRNA    -  Vancomycin resistant Enterococcus sp.: - NOTDETECT MIRNA    -  Tigecycline: S <=1 MIRNA    -  Tobramycin: S <=2 MIRNA    -  Piperacillin/Tazobactam: S <=8 MIRNA    -  Meropenem: S <=1 MIRNA    -  Aztreonam: S <=4 MIRNA    -  Cefazolin: I 4 MIRNA    -  Ampicillin: R >16 MIRNA    -  Ampicillin/Sulbactam: R >16/8 MIRNA    -  Amikacin: S <=8 MIRNA    -  Ertapenem: S <=0.5 MIRNA    -  Ciprofloxacin: S <=0.25 MIRNA    -  Ceftazidime: S <=1 MIRNA    -  Ceftriaxone: S <=1 MIRNA    -  Cefepime: S 4 MIRNA    -  Cefoxitin: S <=4 MIRNA    Specimen Source: BLOOD VENOUS    Organism: BLOOD CULTURE PCR    Organism: BLOOD CULTURE PCR 2  ***Blood Panel PCR results on this specimen are available  approximately 3 hours after the Gram stain result***  Gram stain, PCR, and/or culture results may not always  correspond due to difference in methodologies  ------------------------------------------------------------  This PCR assay was performed using [employer].  The  following targets are tested for:  Enterococcus, vancomycin  resistant enterococci, Listeria monocytogenes,  coagulase  negative staphylococci, S. aureus, methicillin resistant S.  aureus, Streptococcus agalactiae (Group B), S. pneumoniae,  S. pyogenes (Group A), Acinetobacter baumannii, Enterobacter  cloacae, E. coli, Klebsiella oxytoca, K. pneumoniae, Proteus  sp., Serratia marcescens, Haemophilus influenzae, Neisseria  meningitidis, Pseudomonas aeruginosa, Candida albicans, C.  glabrata, C. krusei, C. parapsilosis, C. tropicalis and the  KPC resistance gene.  **NOTE: Due to technical problems, Proteus sp. will NOT be  reported as part of the BCID panel until further notice.    Organism: Escherichia coli    Organism: Enterococcus faecalis    Gram Stain Blood:   ***** CRITICAL RESULT *****  PERSON CALLED / READ-BACK: CHEYANNE MIR RN/Y  DATE / TIME CALLED: 11/15/19 2108  CALLED BY: JENNIFER HOPKINS  ***** CRITICAL RESULT *****  PERSON CALLED / READ-BACK: CHEYANNE MIR RN/Y   DATE / TIME CALLED: 19 [phone]  CALLED BY: TITUS BLAS                *******************************                * This is an appended result. *                *******************************  A prior result that was reported as final has been changed.  GNR^Gram Neg Rods  AFTER: 12 HOURS INCUBATION  BOTTLE: AEROBIC BOTTLE  GPCPR^Gram Pos Cocci in Pairs  AFTER: 16 HOURS INCUBATION  BOTTLE: ANAEROBIC BOTTLE    Organism Identification: BLOOD CULTURE PCR  BLOOD CULTURE PCR 2  Escherichia coli  Enterococcus faecalis    Method Type: PCR    Method Type: PCR    Method Type: NEGATIVE MIRNA 43    RADIOLOGY & ADDITIONAL TESTS:  Results Reviewed:   Imaging Personally Reviewed:  EKG Personally Reviewed:    COORDINATION OF CARE:  Care Discussed with Consultants/Other Providers: Urology PA - discussed checking CDiff PCR of stool  Prior or Outpatient Records Reviewed:

## 2019-11-18 NOTE — PROGRESS NOTE ADULT - ASSESSMENT
87M h/o CAD s/p stents (2001, 2016), HTN, HLD, kidney cancer s/p R lap partial nephrectomy and RPLND 11/1, transferred from OSH w/ acute blood loss anemia due to gross hematuria course c/b diarrhea.

## 2019-11-18 NOTE — PROGRESS NOTE ADULT - ASSESSMENT
87M s/p R lap partial nephrectomy on 11/1/19, postoperative hematuria with H/H drop, s/p IR selective angioembolization of two lower pole vessels 11/12, was transferred to SICU from IR for closer monitoring.  5U PRBC transfused 11/12 in total. Transferred to the floor in stable condition. Failed TOV 11/14, mckeon replaced, temp to 103.5 on 11/15, cultures sent, zosyn started, culture from OSH now w/ E faecalis, CT cystitis, clot in bladder, irrigated no clot able to be evacuated (probably organized), Bcx Ecoli/enterococcus, repeat Ucx pending, remained afebrile overnight    Plan:  -d/c mckeon  - planning for home with or without mckeon  - p.o. cipro for home

## 2019-11-19 ENCOUNTER — TRANSCRIPTION ENCOUNTER (OUTPATIENT)
Age: 84
End: 2019-11-19

## 2019-11-19 VITALS — SYSTOLIC BLOOD PRESSURE: 144 MMHG | HEART RATE: 106 BPM | DIASTOLIC BLOOD PRESSURE: 78 MMHG

## 2019-11-19 LAB
-  AMIKACIN: SIGNIFICANT CHANGE UP
-  AMPICILLIN/SULBACTAM: SIGNIFICANT CHANGE UP
-  AMPICILLIN: SIGNIFICANT CHANGE UP
-  AMPICILLIN: SIGNIFICANT CHANGE UP
-  AZTREONAM: SIGNIFICANT CHANGE UP
-  CANDIDA ALBICANS: SIGNIFICANT CHANGE UP
-  CANDIDA GLABRATA: SIGNIFICANT CHANGE UP
-  CANDIDA KRUSEI: SIGNIFICANT CHANGE UP
-  CANDIDA PARAPSILOSIS: SIGNIFICANT CHANGE UP
-  CANDIDA TROPICALIS: SIGNIFICANT CHANGE UP
-  CEFAZOLIN: SIGNIFICANT CHANGE UP
-  CEFEPIME: SIGNIFICANT CHANGE UP
-  CEFOXITIN: SIGNIFICANT CHANGE UP
-  CEFTAZIDIME: SIGNIFICANT CHANGE UP
-  CEFTRIAXONE: SIGNIFICANT CHANGE UP
-  CIPROFLOXACIN: SIGNIFICANT CHANGE UP
-  COAGULASE NEGATIVE STAPHYLOCOCCUS: SIGNIFICANT CHANGE UP
-  ERTAPENEM: SIGNIFICANT CHANGE UP
-  GENTAMICIN 500: SIGNIFICANT CHANGE UP
-  GENTAMICIN: SIGNIFICANT CHANGE UP
-  IMIPENEM: SIGNIFICANT CHANGE UP
-  K. PNEUMONIAE GROUP: SIGNIFICANT CHANGE UP
-  KPC RESISTANCE GENE: SIGNIFICANT CHANGE UP
-  LEVOFLOXACIN: SIGNIFICANT CHANGE UP
-  MEROPENEM: SIGNIFICANT CHANGE UP
-  PIPERACILLIN/TAZOBACTAM: SIGNIFICANT CHANGE UP
-  STREPTOCOCCUS SP. (NOT GRP A, B OR S PNEUMONIAE): SIGNIFICANT CHANGE UP
-  STREPTOMYCIN 1000: SIGNIFICANT CHANGE UP
-  TIGECYCLINE: SIGNIFICANT CHANGE UP
-  TOBRAMYCIN: SIGNIFICANT CHANGE UP
-  TRIMETHOPRIM/SULFAMETHOXAZOLE: SIGNIFICANT CHANGE UP
-  VANCOMYCIN: SIGNIFICANT CHANGE UP
A BAUMANNII DNA SPEC QL NAA+PROBE: SIGNIFICANT CHANGE UP
ANION GAP SERPL CALC-SCNC: 14 MMO/L — SIGNIFICANT CHANGE UP (ref 7–14)
BACTERIA BLD CULT: SIGNIFICANT CHANGE UP
BUN SERPL-MCNC: 17 MG/DL — SIGNIFICANT CHANGE UP (ref 7–23)
CALCIUM SERPL-MCNC: 8.6 MG/DL — SIGNIFICANT CHANGE UP (ref 8.4–10.5)
CHLORIDE SERPL-SCNC: 105 MMOL/L — SIGNIFICANT CHANGE UP (ref 98–107)
CO2 SERPL-SCNC: 20 MMOL/L — LOW (ref 22–31)
CREAT SERPL-MCNC: 1.26 MG/DL — SIGNIFICANT CHANGE UP (ref 0.5–1.3)
E CLOAC COMP DNA BLD POS QL NAA+PROBE: SIGNIFICANT CHANGE UP
E COLI DNA BLD POS QL NAA+NON-PROBE: SIGNIFICANT CHANGE UP
ENTEROCOC DNA BLD POS QL NAA+NON-PROBE: SIGNIFICANT CHANGE UP
GLUCOSE SERPL-MCNC: 95 MG/DL — SIGNIFICANT CHANGE UP (ref 70–99)
GP B STREP DNA BLD POS QL NAA+NON-PROBE: SIGNIFICANT CHANGE UP
HAEM INFLU DNA BLD POS QL NAA+NON-PROBE: SIGNIFICANT CHANGE UP
HCT VFR BLD CALC: 40.3 % — SIGNIFICANT CHANGE UP (ref 39–50)
HGB BLD-MCNC: 13.2 G/DL — SIGNIFICANT CHANGE UP (ref 13–17)
K OXYTOCA DNA BLD POS QL NAA+NON-PROBE: SIGNIFICANT CHANGE UP
L MONOCYTOG DNA BLD POS QL NAA+NON-PROBE: SIGNIFICANT CHANGE UP
MCHC RBC-ENTMCNC: 28.9 PG — SIGNIFICANT CHANGE UP (ref 27–34)
MCHC RBC-ENTMCNC: 32.8 % — SIGNIFICANT CHANGE UP (ref 32–36)
MCV RBC AUTO: 88.2 FL — SIGNIFICANT CHANGE UP (ref 80–100)
METHOD TYPE: SIGNIFICANT CHANGE UP
METHOD TYPE: SIGNIFICANT CHANGE UP
MRSA SPEC QL CULT: SIGNIFICANT CHANGE UP
MSSA DNA SPEC QL NAA+PROBE: SIGNIFICANT CHANGE UP
N MEN ISLT CULT: SIGNIFICANT CHANGE UP
NRBC # FLD: 0 K/UL — SIGNIFICANT CHANGE UP (ref 0–0)
P AERUGINOSA DNA BLD POS NAA+NON-PROBE: SIGNIFICANT CHANGE UP
PLATELET # BLD AUTO: 284 K/UL — SIGNIFICANT CHANGE UP (ref 150–400)
PMV BLD: 9.3 FL — SIGNIFICANT CHANGE UP (ref 7–13)
POTASSIUM SERPL-MCNC: 4.2 MMOL/L — SIGNIFICANT CHANGE UP (ref 3.5–5.3)
POTASSIUM SERPL-SCNC: 4.2 MMOL/L — SIGNIFICANT CHANGE UP (ref 3.5–5.3)
RBC # BLD: 4.57 M/UL — SIGNIFICANT CHANGE UP (ref 4.2–5.8)
RBC # FLD: 13.7 % — SIGNIFICANT CHANGE UP (ref 10.3–14.5)
S MARCESCENS DNA BLD POS NAA+NON-PROBE: SIGNIFICANT CHANGE UP
S PNEUM DNA BLD POS QL NAA+NON-PROBE: SIGNIFICANT CHANGE UP
S PYO DNA BLD POS QL NAA+NON-PROBE: SIGNIFICANT CHANGE UP
SODIUM SERPL-SCNC: 139 MMOL/L — SIGNIFICANT CHANGE UP (ref 135–145)
WBC # BLD: 7.8 K/UL — SIGNIFICANT CHANGE UP (ref 3.8–10.5)
WBC # FLD AUTO: 7.8 K/UL — SIGNIFICANT CHANGE UP (ref 3.8–10.5)

## 2019-11-19 PROCEDURE — 99232 SBSQ HOSP IP/OBS MODERATE 35: CPT

## 2019-11-19 PROCEDURE — 99238 HOSP IP/OBS DSCHRG MGMT 30/<: CPT | Mod: 25

## 2019-11-19 PROCEDURE — 51702 INSERT TEMP BLADDER CATH: CPT

## 2019-11-19 RX ORDER — MOXIFLOXACIN HYDROCHLORIDE TABLETS, 400 MG 400 MG/1
1 TABLET, FILM COATED ORAL
Qty: 20 | Refills: 0
Start: 2019-11-19 | End: 2019-11-28

## 2019-11-19 RX ORDER — AMLODIPINE BESYLATE 2.5 MG/1
1 TABLET ORAL
Qty: 0 | Refills: 0 | DISCHARGE
Start: 2019-11-19

## 2019-11-19 RX ORDER — TAMSULOSIN HYDROCHLORIDE 0.4 MG/1
1 CAPSULE ORAL
Qty: 30 | Refills: 0
Start: 2019-11-19 | End: 2019-12-18

## 2019-11-19 RX ORDER — LIDOCAINE HCL 20 MG/ML
20 VIAL (ML) INJECTION ONCE
Refills: 0 | Status: DISCONTINUED | OUTPATIENT
Start: 2019-11-19 | End: 2019-11-19

## 2019-11-19 RX ADMIN — OXYCODONE HYDROCHLORIDE 5 MILLIGRAM(S): 5 TABLET ORAL at 11:33

## 2019-11-19 RX ADMIN — AMLODIPINE BESYLATE 5 MILLIGRAM(S): 2.5 TABLET ORAL at 05:26

## 2019-11-19 RX ADMIN — Medication 1 TABLET(S): at 09:17

## 2019-11-19 RX ADMIN — OXYCODONE HYDROCHLORIDE 5 MILLIGRAM(S): 5 TABLET ORAL at 02:25

## 2019-11-19 RX ADMIN — Medication 650 MILLIGRAM(S): at 04:35

## 2019-11-19 RX ADMIN — Medication 100 MILLIGRAM(S): at 05:26

## 2019-11-19 RX ADMIN — Medication 650 MILLIGRAM(S): at 05:05

## 2019-11-19 RX ADMIN — CARVEDILOL PHOSPHATE 12.5 MILLIGRAM(S): 80 CAPSULE, EXTENDED RELEASE ORAL at 07:46

## 2019-11-19 RX ADMIN — HEPARIN SODIUM 5000 UNIT(S): 5000 INJECTION INTRAVENOUS; SUBCUTANEOUS at 05:26

## 2019-11-19 RX ADMIN — PANTOPRAZOLE SODIUM 40 MILLIGRAM(S): 20 TABLET, DELAYED RELEASE ORAL at 07:46

## 2019-11-19 RX ADMIN — PIPERACILLIN AND TAZOBACTAM 25 GRAM(S): 4; .5 INJECTION, POWDER, LYOPHILIZED, FOR SOLUTION INTRAVENOUS at 05:26

## 2019-11-19 RX ADMIN — OXYCODONE HYDROCHLORIDE 5 MILLIGRAM(S): 5 TABLET ORAL at 01:49

## 2019-11-19 NOTE — PROGRESS NOTE ADULT - ASSESSMENT
87M s/p R lap partial nephrectomy on 11/1/19, postoperative hematuria with H/H drop, s/p IR selective angioembolization of two lower pole vessels 11/12, was transferred to SICU from IR for closer monitoring.  5U PRBC transfused 11/12 in total. Transferred to the floor in stable condition. Failed TOV 11/14, mckeon replaced, temp to 103.5 on 11/15, cultures sent, zosyn started, culture from OSH now w/ E faecalis, CT cystitis, clot in bladder, irrigated no clot able to be evacuated (probably organized), Bcx Ecoli/enterococcus, repeat Ucx pending, remained afebrile overnight; 11/18 mckeon removed, pt went into retention; six eye cath placed and irrigated briskly, no clots returned, urine old dark color; mckeon left out; pt began voiding, with not much PVR; also ambulating in hallway    Plan:  -check PVR again  - p.o. cipro for home Plan:  -check PVR again  - p.o. cipro for home

## 2019-11-19 NOTE — DISCHARGE NOTE NURSING/CASE MANAGEMENT/SOCIAL WORK - NSDCPECAREGIVERED_GEN_ALL_CORE
carenotes on mckeon and leg bag care, side effects pamphlet, carenotes on d/c medications mckeon and leg bag care carenotes, hematuria, side effects pamphlet, carenotes on d/c medications

## 2019-11-19 NOTE — PROGRESS NOTE ADULT - SUBJECTIVE AND OBJECTIVE BOX
[facility] Division of Hospital Medicine  Hospitalist: Yin Thrasher MD   Pager: [phone]    CHIEF COMPLAINT: Patient is a 87y old  Male who presents with a chief complaint of Anemia s/p partial nephrectomy (19 Nov 2019 10:51)    SUBJECTIVE / OVERNIGHT EVENTS: Patient seen and examined. No acute events overnight. Patient failed TOV and mckeon was re-inserted. Patient c/o spasms since mckeon was re-inserted.    ADDITIONAL REVIEW OF SYSTEMS:    MEDICATIONS  (STANDING):  amLODIPine   Tablet 5 milliGRAM(s) Oral daily  aspirin  chewable 81 milliGRAM(s) Oral daily  carvedilol 12.5 milliGRAM(s) Oral every 12 hours  heparin  Injectable 5000 Unit(s) SubCutaneous every 8 hours  lactobacillus acidophilus 1 Tablet(s) Oral two times a day with meals  lidocaine 2% Jelly 20 milliLiter(s) IntraUrethral once  pantoprazole    Tablet 40 milliGRAM(s) Oral before breakfast  phenazopyridine 100 milliGRAM(s) Oral every 8 hours  piperacillin/tazobactam IVPB.. 3.375 Gram(s) IV Intermittent every 12 hours  simvastatin 20 milliGRAM(s) Oral at bedtime  tamsulosin 0.4 milliGRAM(s) Oral at bedtime    MEDICATIONS  (PRN):  acetaminophen   Tablet .. 650 milliGRAM(s) Oral every 6 hours PRN Moderate Pain (4 - 6)  acetaminophen   Tablet .. 650 milliGRAM(s) Oral every 6 hours PRN Temp greater or equal to 38C (100.4F)  lidocaine 2% Gel 1 Application(s) Topical every 4 hours PRN catheter irritation  oxyCODONE    IR 5 milliGRAM(s) Oral every 4 hours PRN Severe Pain (7 - 10)      PHYSICAL EXAM:  Vital Signs Last 24 Hrs  T(C): 36.6 (19 Nov 2019 10:16), Max: 36.8 (18 Nov 2019 15:06)  T(F): 97.8 (19 Nov 2019 10:16), Max: 98.3 (18 Nov 2019 15:06)  HR: 106 (19 Nov 2019 11:05) (71 - 107)  BP: 144/78 (19 Nov 2019 11:05) (144/64 - 160/74)  BP(mean): --  RR: 18 (19 Nov 2019 10:16) (17 - 18)  SpO2: 99% (19 Nov 2019 10:16) (97% - 99%)    GENERAL: NAD, well-developed, well-groomed  EYES: PERRLA; conjunctiva and sclera clear  ENMT: Moist oral mucosa, no pharyngeal injection or exudates; normal dentition  NECK: Supple, no palpable masses; no thyromegaly  RESPIRATORY: Normal respiratory effort; lungs are clear to auscultation bilaterally  CARDIOVASCULAR: Regular rate and rhythm, normal S1 and S2, no murmur/rub/gallop;   ABDOMEN: Nontender to palpation, normoactive bowel sounds, no rebound/guarding; No hepatosplenomegaly  EXTREMITIES: No lower extremity edema; Peripheral pulses are 2+ bilaterally    LABS:                        13.2   7.80  )-----------( 284      ( 19 Nov 2019 05:45 )             40.3     11-19    139  |  105  |  17  ----------------------------<  95  4.2   |  20<L>  |  1.26    Ca    8.6      19 Nov 2019 05:45    Culture - Blood (collected 17 Nov 2019 06:31)  Source: BLOOD VENOUS  Preliminary Report (19 Nov 2019 06:31):    NO ORGANISMS ISOLATED    NO ORGANISMS ISOLATED AT 48 HRS.    Culture - Blood (collected 17 Nov 2019 06:31)  Source: BLOOD PERIPHERAL  Preliminary Report (19 Nov 2019 06:31):    NO ORGANISMS ISOLATED    NO ORGANISMS ISOLATED AT 48 HRS.    COORDINATION OF CARE:  Care Discussed with Consultants/Other Providers: Urology PA - discussed disposition  Prior or Outpatient Records Reviewed:

## 2019-11-19 NOTE — PROGRESS NOTE ADULT - SUBJECTIVE AND OBJECTIVE BOX
Afeb 160/74 87 99%RA    Pt has no c/o  Abd- soft NT ND  Void 450  OOB in hallway yesterday  Had one episode loose stool during bladder spasm, none further Afeb 160/74 87 99%RA    Pt has no c/o  Abd- soft NT ND  Void 450  OOB in hallway yesterday  Had one episode loose stool during bladder spasm, none further    *** Pt not able to void this AM;  cc;  18F coude placed for 450 cc willy urine; irrigated but only tiny clots; mckeon kept in

## 2019-11-19 NOTE — DISCHARGE NOTE NURSING/CASE MANAGEMENT/SOCIAL WORK - PATIENT PORTAL LINK FT
You can access the FollowMyHealth Patient Portal offered by [facility] by registering at the following website: [email or web address]. By joining [facility]’s FollowMyHealth portal, you will also be able to view your health information using other applications (apps) compatible with our system.

## 2019-11-19 NOTE — DISCHARGE NOTE PROVIDER - NSDCMRMEDTOKEN_GEN_ALL_CORE_FT
acetaminophen 325 mg oral tablet: 2 tab(s) orally every 6 hours, As needed, Temp greater or equal to 38C (100.4F), Mild Pain (1 - 3)  aspirin 81 mg oral tablet: 1 tab(s) orally once a day  carvedilol 12.5 mg oral tablet: 1 tab(s) orally 2 times a day  CoQ10: 1 tab(s) orally once a day LD 10/25/19  omeprazole 20 mg oral delayed release capsule: 1 cap(s) orally once a day  ramipril 5 mg oral tablet: 1 tab(s) orally once a day  simvastatin 20 mg oral tablet: 1 tab(s) orally once a day acetaminophen 325 mg oral tablet: 2 tab(s) orally every 6 hours, As needed, Temp greater or equal to 38C (100.4F), Mild Pain (1 - 3)  amLODIPine 5 mg oral tablet: 1 tab(s) orally once a day  aspirin 81 mg oral tablet: 1 tab(s) orally once a day  carvedilol 12.5 mg oral tablet: 1 tab(s) orally 2 times a day  Cipro 500 mg oral tablet: 1 tab(s) orally every 12 hours   CoQ10: 1 tab(s) orally once a day LD 10/25/19  omeprazole 20 mg oral delayed release capsule: 1 cap(s) orally once a day  ramipril 5 mg oral tablet: 1 tab(s) orally once a day  simvastatin 20 mg oral tablet: 1 tab(s) orally once a day  tamsulosin 0.4 mg oral capsule: 1 cap(s) orally once a day (at bedtime)

## 2019-11-19 NOTE — DISCHARGE NOTE NURSING/CASE MANAGEMENT/SOCIAL WORK - NSDPDISTO_GEN_ALL_CORE
Home with home care Home with home care/stable, toleraring diet, oob ambulating in hallway, mckeon to leg bag drainage with qs willy urine, stable, tolerating diet, oob ambulating in hallway, mckeon to leg bag drainage with qs willy urine, right groin puncture site, clean dry and intact, abdominal lap sites with steris intact, ecchymosis abd/flank area present/Home with home care

## 2019-11-19 NOTE — DISCHARGE NOTE PROVIDER - CARE PROVIDER_API CALL
Amando Navarro)  Urology  [address] [place] [address], [place] [address]  Phone: [phone]  Fax: [phone]  Follow Up Time:

## 2019-11-19 NOTE — DISCHARGE NOTE NURSING/CASE MANAGEMENT/SOCIAL WORK - NSDCPNINST_GEN_ALL_CORE
Notify Dr Navarro if you experience any increase in pain not relieved with pain medication, any bright red blood or clots in catheter, any fever >100.5 or any redness, drainage or swelling around lap sites.  Drink plenty of fluids. Hand washing prior to mckeon and leg bag care as instructed.  Use small bag during the day large drainage bag at night.  No heavy lifting or straining.

## 2019-11-19 NOTE — DISCHARGE NOTE NURSING/CASE MANAGEMENT/SOCIAL WORK - NSDCPNDISPN_GEN_ALL_CORE
Opioids not applicable/not prescribed/Side effects of pain management treatment/Education provided on the pain management plan of care/Activities of daily living, including home environment that might     exacerbate pain or reduce effectiveness of the pain management plan of care as well as strategies to address these issues

## 2019-11-19 NOTE — DISCHARGE NOTE PROVIDER - HOSPITAL COURSE
87M s/p R lap partial nephrectomy on 11/1/19, postoperative hematuria with H/H drop, s/p IR selective angioembolization of two lower pole vessels 11/12, was transferred to SICU from IR for closer monitoring.  5U PRBC transfused 11/12 in total. Transferred to the floor in stable condition. Failed TOV 11/14, mckeon replaced, temp to 103.5 on 11/15, cultures sent, zosyn started, culture from OSH now w/ E faecalis, CT cystitis, clot in bladder, irrigated no clot able to be evacuated (probably organized), Bcx Ecoli/enterococcus, repeat Ucx pending, remained afebrile overnight; 11/18 mckeon removed, pt went into retention; six eye cath placed and irrigated briskly, no clots returned, urine old dark color; mckeon left out; pt began voiding, with not much PVR; also ambulating in hallway; today 11/19 pt not able to void; #18 coude passed for 450 cc willy urine; labs stable, crit 40; home today with mckeon 87M s/p R lap partial nephrectomy on 11/1/19, postoperative hematuria with H/H drop, s/p IR selective angioembolization of two lower pole vessels 11/12, was transferred to SICU from IR for closer monitoring.  5U PRBC transfused 11/12 in total. Transferred to the floor in stable condition. Failed TOV 11/14, mckeon replaced, temp to 103.5 on 11/15, cultures sent, zosyn started, culture from OSH now w/ E faecalis, CT cystitis, clot in bladder, irrigated no clot able to be evacuated (probably organized), Bcx Ecoli/enterococcus, repeat Ucx pending, remained afebrile overnight; 11/18 mckeon removed, pt went into retention; six eye cath placed and irrigated briskly, no clots returned, urine old dark color; mckeon left out; pt began voiding, with not much PVR; also ambulating in hallway; today 11/19 pt not able to void; #18 coude passed for 450 cc willy urine; labs stable, crit 40; home today with mckeon.

## 2019-11-19 NOTE — PROGRESS NOTE ADULT - ASSESSMENT
87M h/o CAD s/p stents (2001, 2016), HTN, HLD, kidney cancer s/p R lap partial nephrectomy and RPLND 11/1, transferred from OSH w/ acute blood loss anemia due to gross hematuria.

## 2019-11-19 NOTE — DISCHARGE NOTE PROVIDER - NSDCCPCAREPLAN_GEN_ALL_CORE_FT
PRINCIPAL DISCHARGE DIAGNOSIS  Diagnosis: Gross hematuria  Assessment and Plan of Treatment: Mak care as instructed; see Dr. Navarro next week to remove; continue flomax (helps with urination when catheter removed); finish all antibiotic      SECONDARY DISCHARGE DIAGNOSES  Diagnosis: Anemia, unspecified type  Assessment and Plan of Treatment: PRINCIPAL DISCHARGE DIAGNOSIS  Diagnosis: Gross hematuria  Assessment and Plan of Treatment: Mak care as instructed; see Dr. Melanie Oglesby to remove; continue flomax (helps with urination when catheter removed); finish all antibiotic  Do not re-start plavix      SECONDARY DISCHARGE DIAGNOSES  Diagnosis: Anemia, unspecified type  Assessment and Plan of Treatment:

## 2019-11-19 NOTE — PROGRESS NOTE ADULT - REASON FOR ADMISSION
Anemia s/p partial nephrectomy

## 2019-11-19 NOTE — PROGRESS NOTE ADULT - PROBLEM SELECTOR PLAN 3
Sepsis likely d/t UTI   Started on Zosyn   -UCx and BCx from 11/15 growing Escherichia coli + Enterococcus faecalis  -Repeat BCx from 1/17 no growth to date  -c/w zosyn IV day #3/14; will d/c on cipro

## 2019-11-22 LAB
BACTERIA BLD CULT: SIGNIFICANT CHANGE UP
BACTERIA BLD CULT: SIGNIFICANT CHANGE UP

## 2019-11-26 ENCOUNTER — APPOINTMENT (OUTPATIENT)
Dept: UROLOGY | Facility: CLINIC | Age: 84
End: 2019-11-26
Payer: MEDICARE

## 2019-11-26 VITALS
WEIGHT: 178 LBS | HEART RATE: 72 BPM | SYSTOLIC BLOOD PRESSURE: 155 MMHG | TEMPERATURE: 98 F | DIASTOLIC BLOOD PRESSURE: 84 MMHG | HEIGHT: 69 IN | BODY MASS INDEX: 26.36 KG/M2

## 2019-11-26 DIAGNOSIS — N28.89 OTHER SPECIFIED DISORDERS OF KIDNEY AND URETER: ICD-10-CM

## 2019-11-26 PROCEDURE — 99024 POSTOP FOLLOW-UP VISIT: CPT

## 2019-12-06 NOTE — HISTORY OF PRESENT ILLNESS
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  Slowly improving oral intake.\par No gross hematuria.  Normal BM.

## 2019-12-06 NOTE — PHYSICAL EXAM
[General Appearance - In No Acute Distress] : no acute distress [Abdomen Soft] : soft [Abdomen Tenderness] : non-tender [Costovertebral Angle Tenderness] : no ~M costovertebral angle tenderness [FreeTextEntry1] : Incision well healed [Urinary Bladder Findings] : the bladder was normal on palpation [Heart Rate And Rhythm] : Heart rate and rhythm were normal [] : no respiratory distress [Respiration, Rhythm And Depth] : normal respiratory rhythm and effort [Exaggerated Use Of Accessory Muscles For Inspiration] : no accessory muscle use

## 2019-12-06 NOTE — ASSESSMENT
[FreeTextEntry1] : Bladder filled, catheter removed. Voided.\par Path reviewed.\par Continue recovery.  Will need to see oncology/thoracic regarding lung nodule.\par

## 2020-02-03 ENCOUNTER — APPOINTMENT (OUTPATIENT)
Dept: UROLOGY | Facility: CLINIC | Age: 85
End: 2020-02-03
Payer: MEDICARE

## 2020-02-03 VITALS
SYSTOLIC BLOOD PRESSURE: 101 MMHG | BODY MASS INDEX: 22.36 KG/M2 | TEMPERATURE: 97.9 F | RESPIRATION RATE: 13 BRPM | HEIGHT: 69 IN | HEART RATE: 69 BPM | DIASTOLIC BLOOD PRESSURE: 77 MMHG | WEIGHT: 151 LBS | OXYGEN SATURATION: 99 %

## 2020-02-03 PROCEDURE — 99213 OFFICE O/P EST LOW 20 MIN: CPT

## 2020-02-03 NOTE — HISTORY OF PRESENT ILLNESS
[FreeTextEntry1] : He is an 87-year-old who presents in follow-up for prostate cancer and incidental renal mass. On November 1, 2019, he underwent right partial nephrectomy. 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 He is following with cardiothoracic surgery for a lung mass. After renal surgery, he was readmitted for hematuria, sepsis, UTI and required renal angio-embolization. His urination is almost back to baseline. There is no flank pain or gross hematuria. PSA level was 0.13 in May 2019. PVR was 60 cc today. \par Previous note: Around 2005, he underwent  Cyberknide radiation therapy for prostate cancer.  In June 2016, he was hospitalized for GI bleed. MRI showed a right mid pole 2.3 cm solid lesion consistent with renal cell carcinoma.

## 2020-02-03 NOTE — PHYSICAL EXAM
[General Appearance - Well Nourished] : well nourished [General Appearance - Well Developed] : well developed [General Appearance - In No Acute Distress] : no acute distress [Normal Appearance] : normal appearance [Well Groomed] : well groomed [Abdomen Soft] : soft [Abdomen Tenderness] : non-tender [Costovertebral Angle Tenderness] : no ~M costovertebral angle tenderness [Penis Abnormality] : normal uncircumcised penis [Urethral Meatus] : meatus normal [Testes Tenderness] : no tenderness of the testes [Urinary Bladder Findings] : the bladder was normal on palpation [Scrotum] : the scrotum was normal [Prostate Tenderness] : the prostate was not tender [No Prostate Nodules] : no prostate nodules [Testes Mass (___cm)] : there were no testicular masses [FreeTextEntry1] :  RAMÓN done in 2018.  [] : no respiratory distress [Respiration, Rhythm And Depth] : normal respiratory rhythm and effort [Exaggerated Use Of Accessory Muscles For Inspiration] : no accessory muscle use [Inguinal Lymph Nodes Enlarged Bilaterally] : inguinal

## 2020-02-03 NOTE — ASSESSMENT
[FreeTextEntry1] : He is voiding relatively well. Pathology report was discussed with him. Urine culture will be submitted. I spoke to his cardiothoracic surgeon, Dr. Davis, [phone] and he is supposed to have a PET scan soon and hopefully the abdomen will be also evaluated. He can follow up with me in 3-4 months.

## 2020-02-05 LAB — BACTERIA UR CULT: NORMAL

## 2020-06-17 ENCOUNTER — APPOINTMENT (OUTPATIENT)
Dept: UROLOGY | Facility: CLINIC | Age: 85
End: 2020-06-17
Payer: MEDICARE

## 2020-06-17 VITALS — SYSTOLIC BLOOD PRESSURE: 120 MMHG | TEMPERATURE: 98 F | DIASTOLIC BLOOD PRESSURE: 60 MMHG

## 2020-06-17 PROCEDURE — 99213 OFFICE O/P EST LOW 20 MIN: CPT

## 2020-06-17 NOTE — PHYSICAL EXAM
[General Appearance - Well Developed] : well developed [General Appearance - Well Nourished] : well nourished [Normal Appearance] : normal appearance [Well Groomed] : well groomed [General Appearance - In No Acute Distress] : no acute distress [Abdomen Soft] : soft [Costovertebral Angle Tenderness] : no ~M costovertebral angle tenderness [Abdomen Tenderness] : non-tender [Urethral Meatus] : meatus normal [Penis Abnormality] : normal uncircumcised penis [Testes Mass (___cm)] : there were no testicular masses [Urinary Bladder Findings] : the bladder was normal on palpation [Scrotum] : the scrotum was normal [Testes Tenderness] : no tenderness of the testes [Prostate Tenderness] : the prostate was not tender [No Prostate Nodules] : no prostate nodules [] : no respiratory distress [Respiration, Rhythm And Depth] : normal respiratory rhythm and effort [Oriented To Time, Place, And Person] : oriented to person, place, and time [Exaggerated Use Of Accessory Muscles For Inspiration] : no accessory muscle use [FreeTextEntry1] :  RAMÓN done previously.

## 2020-06-17 NOTE — HISTORY OF PRESENT ILLNESS
[FreeTextEntry1] : He is an 87-year-old who presents in follow-up for prostate cancer and incidental renal mass. Recently he underwent radiation therapy for lung cancer. Urination has improved, 2 or 3 times a night. In November 2019, he underwent right partial nephrectomy. 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 After renal surgery, required renal angio-embolization. There is no flank pain or gross hematuria. PSA level was 0.13 in May 2019. PVR was 20 cc today. Addendum: PET scan in February 2020 showed activity at the left lung base only.\par Previous note: Around 2005, he underwent  Cyberknide radiation therapy for prostate cancer.  In June 2016, he was hospitalized for GI bleed. MRI showed a right mid pole 2.3 cm solid lesion consistent with renal cell carcinoma.

## 2020-06-17 NOTE — ASSESSMENT
[FreeTextEntry1] : PSA level will be sent. His urination is back to baseline. We will try to obtain previous imaging study that was done by his other surgeons such as a PET scan for followup imaging of the kidney. He can followup with me in 6 months.

## 2020-06-18 LAB — PSA SERPL-MCNC: 0.09 NG/ML

## 2020-12-16 ENCOUNTER — APPOINTMENT (OUTPATIENT)
Dept: UROLOGY | Facility: CLINIC | Age: 85
End: 2020-12-16
Payer: MEDICARE

## 2020-12-16 VITALS
OXYGEN SATURATION: 98 % | TEMPERATURE: 98 F | DIASTOLIC BLOOD PRESSURE: 81 MMHG | HEART RATE: 74 BPM | RESPIRATION RATE: 14 BRPM | WEIGHT: 151 LBS | BODY MASS INDEX: 22.36 KG/M2 | SYSTOLIC BLOOD PRESSURE: 132 MMHG | HEIGHT: 69 IN

## 2020-12-16 PROCEDURE — 99213 OFFICE O/P EST LOW 20 MIN: CPT | Mod: 25

## 2020-12-16 PROCEDURE — 51741 ELECTRO-UROFLOWMETRY FIRST: CPT

## 2020-12-16 NOTE — HISTORY OF PRESENT ILLNESS
[FreeTextEntry1] : He is an 88-year-old who presents in follow-up for prostate cancer and renal cancer. Nocturia is 2 times. He does not have hematuria or dysuria. PSA was 0.09 on June 2020.  He feels that urinary flow is prolonged. Uroflow today showed max 10, average 4 ml/sec, voided 108 cc and residual urine was minimal.  He underwent radiation therapy for lung cancer.  He believes a renal US was done recently. \par \par In November 2019, he underwent right partial nephrectomy. 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 After renal surgery, required renal angio-embolization. PET scan in February 2020 showed activity at the left lung base only.\par \par Previous note: Around 2005, he underwent  Cyberknide radiation therapy for prostate cancer.  In June 2016, he was hospitalized for GI bleed. MRI showed a right mid pole 2.3 cm solid lesion consistent with renal cell carcinoma.

## 2020-12-16 NOTE — ASSESSMENT
[FreeTextEntry1] : urinary symptoms seem to be controlled well, nocturia is 2 times. Uroflow was reviewed. If he had recent renal US, will try to obtain the results. PSA was very low at the last visit. He can follow up in 6 months. \par \par Juan Miguel Martinez MD, FACS\par [facility]\par  of Urology\par [address] [place] [address], [address]\[place] [address]\par Tel: [phone]\par Fax: [phone]\par

## 2020-12-16 NOTE — PHYSICAL EXAM
[General Appearance - Well Developed] : well developed [General Appearance - Well Nourished] : well nourished [Normal Appearance] : normal appearance [Well Groomed] : well groomed [General Appearance - In No Acute Distress] : no acute distress [Abdomen Soft] : soft [Abdomen Tenderness] : non-tender [Costovertebral Angle Tenderness] : no ~M costovertebral angle tenderness [Urethral Meatus] : meatus normal [Penis Abnormality] : normal uncircumcised penis [Urinary Bladder Findings] : the bladder was normal on palpation [Scrotum] : the scrotum was normal [Testes Tenderness] : no tenderness of the testes [Testes Mass (___cm)] : there were no testicular masses [] : no respiratory distress [Respiration, Rhythm And Depth] : normal respiratory rhythm and effort [Exaggerated Use Of Accessory Muscles For Inspiration] : no accessory muscle use [Oriented To Time, Place, And Person] : oriented to person, place, and time [Affect] : the affect was normal [Mood] : the mood was normal [Not Anxious] : not anxious

## 2021-01-21 NOTE — H&P PST ADULT - VENOUS THROMBOEMBOLISM AGE
Airway patent, Nasal mucosa clear. Mouth with normal mucosa. Throat has no vesicles, no oropharyngeal exudates and uvula is midline. N (3) age 75 years or over Airway patent, Nasal mucosa clear. Mouth with normal mucosa. Throat has no vesicles, no oropharyngeal exudates and uvula is midline.

## 2021-05-18 NOTE — PROGRESS NOTE ADULT - SUBJECTIVE AND OBJECTIVE BOX
Subjective  No issues overnight. Pain controlled, tolerating PO diet. BP controlled.    Objective    Vital signs  T(F): , Max: 98.7 (19 @ 20:38)  HR: 90 (19 @ 10:03)  BP: 167/67 (19 @ 10:03)  SpO2: 99% (19 @ 10:03)  Wt(kg): --    Output     OUT:    Indwelling Catheter - Urethral: 1750 mL  Total OUT: 1750 mL    Total NET: -1750 mL      OUT:    Indwelling Catheter - Urethral: 200 mL  Total OUT: 200 mL    Total NET: -200 mL          Gen: NAD  Abd: soft, NT, ND  : mckeon in place, dark tea colored urine    Labs       @ 06:15    WBC 6.52  / Hct 34.2  / SCr 1.53      @ 06:19    WBC 8.24  / Hct 32.6  / SCr 1.84           Culture - Urine (11.15.19 @ 10:09)    Culture - Urine:   EC^Escherichia coli  COLONY COUNT: > = 100,000 CFU/ML    Specimen Source: URINE CATHETER    Culture - Blood (11.15.19 @ 06:13)    Culture - Blood:   ***Blood Panel PCR results on this specimen are available  approximately 3 hours after the Gram stain result***  Gram stain, PCR, and/or culture results may not always  correspond due to difference in methodologies  ------------------------------------------------------------  This PCR assay was performed using [employer].  The  following targets are tested for:  Enterococcus, vancomycin  resistant enterococci, Listeria monocytogenes,  coagulase  negative staphylococci, S. aureus, methicillin resistant S.  aureus, Streptococcus agalactiae (Group B), S. pneumoniae,  S. pyogenes (Group A), Acinetobacter baumannii, Enterobacter  cloacae, E. coli, Klebsiella oxytoca, K. pneumoniae, Proteus  sp., Serratia marcescens, Haemophilus influenzae, Neisseria  meningitidis, Pseudomonas aeruginosa, Candida albicans, C.  glabrata, C. krusei, C. parapsilosis, C. tropicalis and the  KPC resistance gene.  **NOTE: Due to technical problems, Proteus sp. will NOT be  reported as part of the BCID paneluntil further notice.    -  Escherichia coli: - NOTDETECT MIRNA    -  Klebsiella oxytoca: - NOTDETECT IMRNA    -  Klebsiella pneumoniae: - NOTDETECT MIRNA    -  Serratia marcescens: - NOTDETECT MIRNA    -  Haemophilus influenzae: - NOTDETECT MIRNA    -  Listeria monocytogenes: - NOTDETECT MIRNA    -  Neisseria meningitidis: - NOTDETECT MIRAN    -  Pseudomonas aeruginosa: - NOTDETECT MIRNA    -  Acinetobacter baumanii: - NOTDETECT MIRNA    -  Enterobacter cloacae complex: - NOTDETECT MIRNA    -  Streptococcus sp. (Not Grp A, B or S pneumoniae): - NOTDETECT MIRNA    -  Streptococcus agalactiae (Group B): - NOTDETECT MIRNA    -  Streptococcus pyogenes (Group A): - NOTDETECT MIRNA    -  Streptococcus pneumoniae: - NOTDETECT MIRNA    -  Candida albicans: - NOTDETECT MIRNA    -  Candida glabrata: - NOTDETECT MIRNA    -  Bing krusei: - NOTDETECT MIRNA    -  Candida parapsilosis: - NOTDETECT MIRNA    -  Candida tropicalis: - NOTDETECT MIRNA    -  Multidrug (KPC pos) resistant organism: - NOTDETECT MIRNA    -  Staphylococcus aureus: - NOTDETECT MIRNA    -  Methicillin resistant Staphylococcus aureus (MRSA): - NOTDETECT MIRNA    -  Coagulase negative Staphylococcus: - NOTDETECT MIRNA    -  Enterococcus species: - NOTDETECT MIRNA    -  Enterococcus species: + DETECT MIRNA    -  Vancomycin resistant Enterococcus sp.: - NOTDETECT MIRNA    Specimen Source: BLOOD VENOUS    Organism: BLOOD CULTURE PCR    Organism: BLOOD CULTURE PCR 2  ***Blood Panel PCR results on this specimen are available  approximately 3 hours after the Gram stain result***  Gram stain, PCR, and/or culture results may not always  correspond due to difference in methodologies  ------------------------------------------------------------  This PCR assay was performed using [employer].  The  following targets are tested for:  Enterococcus, vancomycin  resistant enterococci, Listeria monocytogenes,  coagulase  negative staphylococci, S. aureus, methicillin resistant S.  aureus, Streptococcus agalactiae (Group B), S. pneumoniae,  S. pyogenes (Group A), Acinetobacter baumannii, Enterobacter  cloacae, E. coli, Klebsiella oxytoca, K. pneumoniae, Proteus  sp., Serratia marcescens, Haemophilus influenzae, Neisseria  meningitidis, Pseudomonas aeruginosa, Candida albicans, C.  glabrata, C. krusei, C. parapsilosis, C. tropicalis and the  KPC resistance gene.  **NOTE: Due to technical problems, Proteus sp. will NOT be  reported as part of the BCID panel until further notice.    Gram Stain Blood:   ***** CRITICAL RESULT *****  PERSON CALLED / READ-BACK: CHEYANNE MIR RN/Y  DATE / TIME CALLED: 11/15/19 2108  CALLED BY: JENNIFER HOPKINS  ***** CRITICAL RESULT *****  PERSON CALLED / READ-BACK: CHEYANNE MIR RN/Y   DATE / TIME CALLED: 19 [id number]  CALLED BY: TITUS BLAS                *******************************                * This is an appended result. *                *******************************  A prior result that was reported as final has been changed.  GNR^Gram Neg Rods  AFTER: 12 HOURS INCUBATION  BOTTLE: AEROBIC BOTTLE  GPCPR^Gram Pos Cocci in Pairs  AFTER: 16 HOURS INCUBATION  BOTTLE: ANAEROBIC BOTTLE    Organism Identification: BLOOD CULTURE PCR  BLOOD CULTURE PCR 2    Method Type: PCR    Method Type: PCR Intermediate Repair Preamble Text (Leave Blank If You Do Not Want): Undermining was performed with blunt dissection.

## 2021-06-16 ENCOUNTER — APPOINTMENT (OUTPATIENT)
Dept: UROLOGY | Facility: CLINIC | Age: 86
End: 2021-06-16
Payer: MEDICARE

## 2021-06-16 VITALS
DIASTOLIC BLOOD PRESSURE: 82 MMHG | WEIGHT: 151 LBS | HEIGHT: 69 IN | TEMPERATURE: 97.5 F | RESPIRATION RATE: 13 BRPM | BODY MASS INDEX: 22.36 KG/M2 | HEART RATE: 69 BPM | SYSTOLIC BLOOD PRESSURE: 170 MMHG | OXYGEN SATURATION: 96 %

## 2021-06-16 PROCEDURE — 99214 OFFICE O/P EST MOD 30 MIN: CPT

## 2021-06-16 NOTE — HISTORY OF PRESENT ILLNESS
[FreeTextEntry1] : He is an 88-year-old who is seen in follow-up for prostate cancer and renal cancer.  In the last month, he has developed significant urinary frequency, nocturia up to 6 times and urge incontinence.  He has no dysuria.  There is no change in his diet.  Residual urine today was 50 cc and he had the urge to urinate.  Previously he did not have significant urinary symptoms.  He wants to try herbal supplements.  He does not recall if a recent renal imaging was done.  He is due for PSA level.\par PSA was 0.09 on June 2020. Uroflow showed max 10, average 4 ml/sec, voided 108 cc and residual urine was minimal.  He underwent radiation therapy for lung cancer.  \par \par Previous notes: In November 2019, he underwent right partial nephrectomy. 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 After renal surgery, required renal angio-embolization. PET scan in February 2020 showed activity at the left lung base only.\par \par Around 2005, he underwent  Cyberknide radiation therapy for prostate cancer.  In June 2016, he was hospitalized for GI bleed. MRI showed a right mid pole 2.3 cm solid lesion consistent with renal cell carcinoma.

## 2021-06-16 NOTE — REVIEW OF SYSTEMS
[see HPI] : see HPI [Incontinence] : incontinence [Nocturia] : nocturia [Negative] : Gastrointestinal

## 2021-06-16 NOTE — ASSESSMENT
[FreeTextEntry1] : There has been a sudden change in his urinary symptoms.  Residual urine volume is minimal.  Urine culture will be sent.  He wants to try over-the-counter supplements but if not effective, he will get back to me to discuss alpha-blocker therapy such as tamsulosin.  PSA level will be sent in follow-up for prostate cancer.  He will undergo renal ultrasound and follow-up to renal cell carcinoma.  Follow-up in 4 to 6 months.

## 2021-06-17 LAB — PSA SERPL-MCNC: 0.1 NG/ML

## 2021-06-18 LAB — BACTERIA UR CULT: NORMAL

## 2021-06-29 ENCOUNTER — APPOINTMENT (OUTPATIENT)
Dept: ULTRASOUND IMAGING | Facility: CLINIC | Age: 86
End: 2021-06-29
Payer: MEDICARE

## 2021-06-29 ENCOUNTER — OUTPATIENT (OUTPATIENT)
Dept: OUTPATIENT SERVICES | Facility: HOSPITAL | Age: 86
LOS: 1 days | End: 2021-06-29
Payer: MEDICARE

## 2021-06-29 DIAGNOSIS — Z95.5 PRESENCE OF CORONARY ANGIOPLASTY IMPLANT AND GRAFT: Chronic | ICD-10-CM

## 2021-06-29 DIAGNOSIS — C64.1 MALIGNANT NEOPLASM OF RIGHT KIDNEY, EXCEPT RENAL PELVIS: ICD-10-CM

## 2021-06-29 DIAGNOSIS — Z96.649 PRESENCE OF UNSPECIFIED ARTIFICIAL HIP JOINT: Chronic | ICD-10-CM

## 2021-06-29 PROCEDURE — 76775 US EXAM ABDO BACK WALL LIM: CPT | Mod: 26

## 2021-06-29 PROCEDURE — 76775 US EXAM ABDO BACK WALL LIM: CPT

## 2021-12-16 ENCOUNTER — APPOINTMENT (OUTPATIENT)
Dept: UROLOGY | Facility: CLINIC | Age: 86
End: 2021-12-16
Payer: MEDICARE

## 2021-12-16 VITALS
HEIGHT: 69 IN | BODY MASS INDEX: 23.11 KG/M2 | DIASTOLIC BLOOD PRESSURE: 80 MMHG | WEIGHT: 156 LBS | SYSTOLIC BLOOD PRESSURE: 160 MMHG | TEMPERATURE: 98.1 F

## 2021-12-16 PROCEDURE — 99213 OFFICE O/P EST LOW 20 MIN: CPT

## 2021-12-16 NOTE — HISTORY OF PRESENT ILLNESS
[FreeTextEntry1] : He is an 89-year-old who is seen in follow-up for prostate cancer and renal cancer.  He is having urgent continence and nocturia about 3 times.  He is using herbal medication therapies.  Residual urine today was about 100 cc.  There is no hematuria, flank pain or dysuria.  In June 2021, PSA level was 0.1.  Ultrasound in June 2021 showed right kidney with postsurgical changes and no evidence of residual mass.\par \par Previously, uroflow showed max 10, average 4 ml/sec, voided 108 cc and residual urine was minimal.  He underwent radiation therapy for lung cancer.  \par \par Previous notes: In November 2019, he underwent right partial nephrectomy. 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 After renal surgery, required renal angio-embolization. PET scan in February 2020 showed activity at the left lung base only.\par \par Around 2005, he underwent  Cyberknide radiation therapy for prostate cancer.  In June 2016, he was hospitalized for GI bleed. MRI showed a right mid pole 2.3 cm solid lesion consistent with renal cell carcinoma.

## 2021-12-16 NOTE — ASSESSMENT
[FreeTextEntry1] : PSA level will be repeated.  We discussed using alpha-blocker therapy but he wants to continue with herbal medications.  Residual urine volume is minimal.  Renal ultrasound previously showed no recurrence and could be repeated after the next visit.  He will follow up in about 6 months.

## 2021-12-16 NOTE — PHYSICAL EXAM
[General Appearance - Well Developed] : well developed [General Appearance - Well Nourished] : well nourished [Normal Appearance] : normal appearance [Well Groomed] : well groomed [General Appearance - In No Acute Distress] : no acute distress [Abdomen Soft] : soft [Abdomen Tenderness] : non-tender [Costovertebral Angle Tenderness] : no ~M costovertebral angle tenderness [Urethral Meatus] : meatus normal [Penis Abnormality] : normal uncircumcised penis [Urinary Bladder Findings] : the bladder was normal on palpation [Scrotum] : the scrotum was normal [Testes Tenderness] : no tenderness of the testes [Testes Mass (___cm)] : there were no testicular masses [] : no respiratory distress [Respiration, Rhythm And Depth] : normal respiratory rhythm and effort [Exaggerated Use Of Accessory Muscles For Inspiration] : no accessory muscle use [Normal Station and Gait] : the gait and station were normal for the patient's age

## 2021-12-17 LAB
ANION GAP SERPL CALC-SCNC: 13 MMOL/L
BUN SERPL-MCNC: 31 MG/DL
CALCIUM SERPL-MCNC: 9.3 MG/DL
CHLORIDE SERPL-SCNC: 107 MMOL/L
CO2 SERPL-SCNC: 23 MMOL/L
CREAT SERPL-MCNC: 0.99 MG/DL
GLUCOSE SERPL-MCNC: 92 MG/DL
POTASSIUM SERPL-SCNC: 4.8 MMOL/L
PSA SERPL-MCNC: 0.1 NG/ML
SODIUM SERPL-SCNC: 142 MMOL/L

## 2022-02-07 NOTE — ASU PATIENT PROFILE, ADULT - TRANSFUSION PREMEDICATION REQUIRED
[facility] Hospitalist Progress Note    Subjective     Patient seen resting in bed. Awakes to stimuli but quite sleepy. Discussed with nursing. Patient has been mostly sleeping and waking up to use the bathroom only.    Objective     I/O's  No intake or output data in the 24 hours ending 02/07/22 0920    Last Recorded Vitals  Vitals with min/max:      Vital Last Value 24 Hour Range   Temperature 98.6 °F (37 °C) (02/07/22 0854) Temp  Min: 96.4 °F (35.8 °C)  Max: 98.6 °F (37 °C)   Pulse 79 (02/07/22 0854) Pulse  Min: 79  Max: 95   Respiratory 16 (02/07/22 0854) Resp  Min: 16  Max: 18   Non-Invasive  Blood Pressure (!) 140/84 (02/07/22 0854) BP  Min: 140/84  Max: 197/99   Pulse Oximetry 99 % (02/07/22 0854) SpO2  Min: 98 %  Max: 100 %   Arterial   Blood Pressure   No data recorded        Body mass index is 30.18 kg/m².     Physical Exam    General: No acute distress .   Head: No signs of head trauma.   Eyes: Grossly normal    Ears:  grossly normal.   Heart: Regular rate and rhythm.    Lungs: Normal respiratory rate and effort      Musculoskeletal: No obvious deformities     Psychiatric: Calm    Labs      Recent Results (from the past 24 hour(s))   Electrocardiogram 12-Lead    Collection Time: 02/06/22  5:03 PM   Result Value Ref Range    Ventricular Rate EKG/Min (BPM) 79     Atrial Rate (BPM) 79     HI-Interval (MSEC) 188     QRS-Interval (MSEC) 90     QT-Interval (MSEC) 372     QTc 427     P Axis (Degrees) 10     R Axis (Degrees) 6     T Axis (Degrees) -16     REPORT TEXT       Sinus rhythm  Nonspecific repol abnormality, inferior leads  Confirmed by TOM KYLE MD ([id number]) on 2/7/2022 6:24:49 AM     Comprehensive Metabolic Panel    Collection Time: 02/06/22  7:34 PM   Result Value Ref Range    Fasting Status      Sodium 136 135 - 145 mmol/L    Potassium 3.7 3.4 - 5.1 mmol/L    Chloride 104 98 - 107 mmol/L    Carbon Dioxide 30 21 - 32 mmol/L    Anion Gap 6 (L) 10 - 20 mmol/L    Glucose 105 (H) 70 - 99 mg/dL    BUN 12 6 -  20 mg/dL    Creatinine 0.77 0.67 - 1.17 mg/dL    Glomerular Filtration Rate >90 >=60    BUN/ Creatinine Ratio 16 7 - 25    Calcium 9.5 8.4 - 10.2 mg/dL    Bilirubin, Total 0.3 0.2 - 1.0 mg/dL    GOT/AST 25 <=37 Units/L    GPT/ALT 21 <64 Units/L    Alkaline Phosphatase 97 45 - 117 Units/L    Albumin 3.1 (L) 3.6 - 5.1 g/dL    Protein, Total 7.6 6.4 - 8.2 g/dL    Globulin 4.5 (H) 2.0 - 4.0 g/dL    A/G Ratio 0.7 (L) 1.0 - 2.4   Magnesium    Collection Time: 02/06/22  7:34 PM   Result Value Ref Range    Magnesium 2.8 (H) 1.7 - 2.4 mg/dL   Alcohol    Collection Time: 02/06/22  7:34 PM   Result Value Ref Range    Alcohol None Detected None Detected mg/dL   COVID/Flu/RSV panel    Collection Time: 02/06/22  7:34 PM   Result Value Ref Range    Rapid SARS-COV-2 by PCR Not Detected Not Detected / Detected / Presumptive Positive / Inhibitors present    Influenza A by PCR Not Detected Not Detected    Influenza B by PCR Not Detected Not Detected    RSV BY PCR Not Detected Not Detected    Isolation Guidelines      Procedural Comment     CBC with Automated Differential (performable only)    Collection Time: 02/06/22  7:34 PM   Result Value Ref Range    WBC 4.9 4.2 - 11.0 K/mcL    RBC 4.88 4.50 - 5.90 mil/mcL    HGB 13.6 13.0 - 17.0 g/dL    HCT 42.2 39.0 - 51.0 %    MCV 86.5 78.0 - 100.0 fl    MCH 27.9 26.0 - 34.0 pg    MCHC 32.2 32.0 - 36.5 g/dL    RDW-CV 14.6 11.0 - 15.0 %    RDW-SD 45.4 39.0 - 50.0 fL     140 - 450 K/mcL    NRBC 0 <=0 /100 WBC    Neutrophil, Percent 54 %    Lymphocytes, Percent 27 %    Mono, Percent 12 %    Eosinophils, Percent 6 %    Basophils, Percent 1 %    Immature Granulocytes 0 %    Absolute Neutrophils 2.6 1.8 - 7.7 K/mcL    Absolute Lymphocytes 1.3 1.0 - 4.8 K/mcL    Absolute Monocytes 0.6 0.3 - 0.9 K/mcL    Absolute Eosinophils  0.3 0.0 - 0.5 K/mcL    Absolute Basophils 0.0 0.0 - 0.3 K/mcL    Absolute Immmature Granulocytes 0.0 0.0 - 0.2 K/mcL   Magnesium    Collection Time: 02/07/22  6:53 AM    Result Value Ref Range    Magnesium 2.6 (H) 1.7 - 2.4 mg/dL   Comprehensive Metabolic Panel    Collection Time: 02/07/22  6:53 AM   Result Value Ref Range    Fasting Status      Sodium 137 135 - 145 mmol/L    Potassium 3.2 (L) 3.4 - 5.1 mmol/L    Chloride 105 98 - 107 mmol/L    Carbon Dioxide 26 21 - 32 mmol/L    Anion Gap 9 (L) 10 - 20 mmol/L    Glucose 110 (H) 70 - 99 mg/dL    BUN 7 6 - 20 mg/dL    Creatinine 0.70 0.67 - 1.17 mg/dL    Glomerular Filtration Rate >90 >=60    BUN/ Creatinine Ratio 10 7 - 25    Calcium 9.1 8.4 - 10.2 mg/dL    Bilirubin, Total 0.3 0.2 - 1.0 mg/dL    GOT/AST 12 <=37 Units/L    GPT/ALT 19 <64 Units/L    Alkaline Phosphatase 71 45 - 117 Units/L    Albumin 3.0 (L) 3.6 - 5.1 g/dL    Protein, Total 7.3 6.4 - 8.2 g/dL    Globulin 4.3 (H) 2.0 - 4.0 g/dL    A/G Ratio 0.7 (L) 1.0 - 2.4   CBC with Automated Differential (performable only)    Collection Time: 02/07/22  6:53 AM   Result Value Ref Range    WBC 3.7 (L) 4.2 - 11.0 K/mcL    RBC 5.00 4.50 - 5.90 mil/mcL    HGB 14.0 13.0 - 17.0 g/dL    HCT 43.5 39.0 - 51.0 %    MCV 87.0 78.0 - 100.0 fl    MCH 28.0 26.0 - 34.0 pg    MCHC 32.2 32.0 - 36.5 g/dL    RDW-CV 14.6 11.0 - 15.0 %    RDW-SD 46.4 39.0 - 50.0 fL     140 - 450 K/mcL    NRBC 0 <=0 /100 WBC    Neutrophil, Percent 46 %    Lymphocytes, Percent 33 %    Mono, Percent 13 %    Eosinophils, Percent 7 %    Basophils, Percent 1 %    Immature Granulocytes 0 %    Absolute Neutrophils 1.7 (L) 1.8 - 7.7 K/mcL    Absolute Lymphocytes 1.2 1.0 - 4.8 K/mcL    Absolute Monocytes 0.5 0.3 - 0.9 K/mcL    Absolute Eosinophils  0.3 0.0 - 0.5 K/mcL    Absolute Basophils 0.1 0.0 - 0.3 K/mcL    Absolute Immmature Granulocytes 0.0 0.0 - 0.2 K/mcL         Imaging  No orders to display       Assessment & Plan     Alcohol dependence with withdrawal  Heroin abuse with impending withdrawal  Cocaine abuse   -Admit for alcohol detox  -Monitor on CIWA protocol, prn ativan  -Seizure precautions  -Thiamine, folic  acid  -Addiction medicine on consult, appreciate recs    History of seizures  -Monitor, not on any medications       Current Facility-Administered Medications   Medication Dose Route Frequency Provider Last Rate Last Admin   • LORazepam (ATIVAN) injection 1 mg  1 mg Intravenous Q1H PRN Edith Kenroy Suzanne, DO       • Potassium Standard Replacement Protocol   Does not apply See Admin Instructions Edith Yung Choy, DO       • Magnesium Standard Replacement Protocol   Does not apply See Admin Instructions Edith Yung Choy, DO       • docusate sodium-sennosides (SENOKOT S) 50-8.6 MG 2 tablet  2 tablet Oral Daily PRN Edith Yung Choy, DO       • aluminum-magnesium hydroxide-simethicone (MAALOX) 200-200-20 MG/5ML suspension 30 mL  30 mL Oral Q4H PRN Edith Yung Choy, DO       • sodium chloride 0.9 % flush bag 25 mL  25 mL Intravenous PRN Edithu Yung Choy, DO       • sodium chloride 0.9 % flush bag 25 mL  25 mL Intravenous PRN [facility] Yung Choy, DO       • sodium chloride (PF) 0.9 % injection 2 mL  2 mL Intracatheter 2 times per day Edith Yung Choy, DO       • enoxaparin (LOVENOX) injection 40 mg  40 mg Subcutaneous Daily Edith Yung Choy, DO       • folic acid (FOLATE) tablet 1 mg  1 mg Oral Daily Siu Yung Choy, DO   1 mg at 02/06/22 2336   • thiamine (VITAMIN B1) tablet 100 mg  100 mg Oral Daily Edithu Yung Choy, DO   100 mg at 02/06/22 2336       DVT PPX:   Current Active Medications for DVT Prophylaxis (From admission, onward)         Stop     enoxaparin (LOVENOX) injection 40 mg  40 mg,   Subcutaneous,   DAILY         --               Primary Care Physician No Pcp  Code Status   Code Status: Full Resuscitation    Bryan Charles MD  [facility] Hospitalist    Greater than 50% of the time spent reviewing patient records, coordinating patient care plan, and discussing the above care plan with the patient, nursing, and medical staff.  Bryan Charles MD     Disclaimer: Patient chart was completed partially or completely using speech recognition software, minor  errors in transcription may be present. Note to patient: This is a medical document that is intended as a peer to peer communication intended to carry relevant medical information and is written in medical language that may appear blunt or direct.         none

## 2022-06-16 ENCOUNTER — APPOINTMENT (OUTPATIENT)
Dept: UROLOGY | Facility: CLINIC | Age: 87
End: 2022-06-16
Payer: MEDICARE

## 2022-06-16 PROCEDURE — 99214 OFFICE O/P EST MOD 30 MIN: CPT

## 2022-06-16 NOTE — HISTORY OF PRESENT ILLNESS
[FreeTextEntry1] : He is an 89-year-old who is seen in follow-up for prostate cancer and renal cancer.  Since he was prescribed tamsulosin, his urinary symptoms specially urge incontinence have improved.  Nocturia is twice.  Residual urine volume was minimal.  PSA level was 0.1 and creatinine was 0.99 in December 2021.  He is due for repeat renal ultrasound.  There is no gross hematuria or flank pain. Ultrasound in June 2021 showed right kidney with postsurgical changes and no evidence of residual mass.\par \par Previously, uroflow showed max 10, average 4 ml/sec, voided 108 cc and residual urine was minimal.  He underwent radiation therapy for lung cancer.  \par \par Previous notes: In November 2019, he underwent right partial nephrectomy. 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 After renal surgery, required renal angio-embolization. PET scan in February 2020 showed activity at the left lung base only.\par \par Around 2005, he underwent  Cyberknide radiation therapy for prostate cancer.  In June 2016, he was hospitalized for GI bleed. MRI showed a right mid pole 2.3 cm solid lesion consistent with renal cell carcinoma.

## 2022-06-16 NOTE — ASSESSMENT
[FreeTextEntry1] : From urinary standpoint he is doing well with tamsulosin which he will continue.  Urgency and nocturia have improved.  PSA level remains quite low and it will continue to be monitored and will be repeated today.  In follow-up for kidney cancer, he will undergo renal ultrasound and contact me for the results.  He also follows up with his oncologist.  He can follow-up in 6 months.

## 2022-06-16 NOTE — PHYSICAL EXAM
[Urethral Meatus] : meatus normal [Penis Abnormality] : normal uncircumcised penis [Urinary Bladder Findings] : the bladder was normal on palpation [Scrotum] : the scrotum was normal [Testes Tenderness] : no tenderness of the testes [Testes Mass (___cm)] : there were no testicular masses [General Appearance - Well Developed] : well developed [General Appearance - Well Nourished] : well nourished [Normal Appearance] : normal appearance [Well Groomed] : well groomed [General Appearance - In No Acute Distress] : no acute distress [Abdomen Soft] : soft [Abdomen Tenderness] : non-tender [Costovertebral Angle Tenderness] : no ~M costovertebral angle tenderness [] : no respiratory distress [Respiration, Rhythm And Depth] : normal respiratory rhythm and effort [Exaggerated Use Of Accessory Muscles For Inspiration] : no accessory muscle use

## 2022-06-17 LAB — PSA SERPL-MCNC: 0.1 NG/ML

## 2022-07-07 ENCOUNTER — APPOINTMENT (OUTPATIENT)
Dept: ULTRASOUND IMAGING | Facility: CLINIC | Age: 87
End: 2022-07-07

## 2022-07-07 ENCOUNTER — OUTPATIENT (OUTPATIENT)
Dept: OUTPATIENT SERVICES | Facility: HOSPITAL | Age: 87
LOS: 1 days | End: 2022-07-07
Payer: MEDICARE

## 2022-07-07 DIAGNOSIS — C64.1 MALIGNANT NEOPLASM OF RIGHT KIDNEY, EXCEPT RENAL PELVIS: ICD-10-CM

## 2022-07-07 DIAGNOSIS — Z96.649 PRESENCE OF UNSPECIFIED ARTIFICIAL HIP JOINT: Chronic | ICD-10-CM

## 2022-07-07 DIAGNOSIS — Z95.5 PRESENCE OF CORONARY ANGIOPLASTY IMPLANT AND GRAFT: Chronic | ICD-10-CM

## 2022-07-07 PROCEDURE — 76775 US EXAM ABDO BACK WALL LIM: CPT | Mod: 26

## 2022-07-07 PROCEDURE — 76775 US EXAM ABDO BACK WALL LIM: CPT

## 2022-07-14 ENCOUNTER — RX RENEWAL (OUTPATIENT)
Age: 87
End: 2022-07-14

## 2022-10-11 NOTE — CONSULT NOTE ADULT - ATTENDING COMMENTS
87yom with PMH of CAD s/p stenting (2001+2016), HTN, HLD, kidney cancer s/p R lap partial nephrectomy and RPLND 11/1, who presented with gross hematuria and perinephric hematoma now s/p embolization.    PE by me;  NEURO  A+Ox3  RESPIRATORY  No accessory muscle use, on RA  CARDIOVASCULAR  Hypertensive to 170's/90s, HR 80's  GI/NUTRITION  Abdomen soft, non-tender     PLAN  NEURO:  Pain control with tylenol and fentanyl    RESPIRATORY:   on RA    CARDIOVASCULAR:  Hypertensive in PACU, takes ramipril 5mg and carvedilol 12.5mg at home  - Labetalol PRN    GI/NUTRITION:  NPO  GENITOURINARY/RENAL:  S/p arterial embolization of two lower pole vessels  Strict I/O's  Replete electrolytes as needed    HEMATOLOGIC:  S/p 5 u pRBC's total  Trend H+H  Holding DVT prophylaxis in context of bleed  INFECTIOUS DISEASE:  Afebrile  Trend WBC  ENDOCRINE:    No active issues    DISPO: d/c floor   I have personally examined the patient, reviewed data and laboratory tests/x-rays and all pertinent electronic images. Agree with notes of health care providers on my service.  The SICU team has a constant risk benefit analyzes discussion with the primary team, all consultants, House Staff and PA's on all decisions.  These diagnoses are unrelated to the surgical procedure noted above. Was The Patient On Physician Recommended Anticoagulation Therapy?: Please Select the Appropriate Response

## 2022-12-22 ENCOUNTER — APPOINTMENT (OUTPATIENT)
Dept: UROLOGY | Facility: CLINIC | Age: 87
End: 2022-12-22

## 2023-01-05 ENCOUNTER — APPOINTMENT (OUTPATIENT)
Dept: UROLOGY | Facility: CLINIC | Age: 88
End: 2023-01-05

## 2023-01-30 ENCOUNTER — APPOINTMENT (OUTPATIENT)
Dept: UROLOGY | Facility: CLINIC | Age: 88
End: 2023-01-30
Payer: MEDICARE

## 2023-01-30 DIAGNOSIS — C64.1 MALIGNANT NEOPLASM OF RIGHT KIDNEY, EXCEPT RENAL PELVIS: ICD-10-CM

## 2023-01-30 DIAGNOSIS — Z85.46 PERSONAL HISTORY OF MALIGNANT NEOPLASM OF PROSTATE: ICD-10-CM

## 2023-01-30 DIAGNOSIS — N40.0 BENIGN PROSTATIC HYPERPLASIA WITHOUT LOWER URINARY TRACT SYMPMS: ICD-10-CM

## 2023-01-30 PROCEDURE — 99214 OFFICE O/P EST MOD 30 MIN: CPT

## 2023-01-30 NOTE — HISTORY OF PRESENT ILLNESS
[FreeTextEntry1] : He is an 89 year-old who is seen in follow-up for prostate cancer and renal cancer.  He is not sure if tamsulosin has been very helpful recently but due to memory loss he is not sure if he is taking it.  Previously it was helping with urinary frequency, urgency and nocturia.  Residual urine volume today was minimal.  In June 2022 PSA level was 0.1 and in July 2022 ultrasound of the kidneys was stable without any renal masses.  Creatinine level previously was 0.99.  He has no flank pain or weight loss.\par Previously, uroflow showed max 10, average 4 ml/sec, voided 108 cc and residual urine was minimal.  He underwent radiation therapy for lung cancer.  \par \par Previous notes: In November 2019, he underwent right partial nephrectomy. 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 After renal surgery, required renal angio-embolization. PET scan in February 2020 showed activity at the left lung base only.\par \par Around 2005, he underwent  Cyberknide radiation therapy for prostate cancer.  In June 2016, he was hospitalized for GI bleed. MRI showed a right mid pole 2.3 cm solid lesion consistent with renal cell carcinoma.

## 2023-01-30 NOTE — PHYSICAL EXAM
[Urethral Meatus] : meatus normal [Penis Abnormality] : normal uncircumcised penis [Urinary Bladder Findings] : the bladder was normal on palpation [Scrotum] : the scrotum was normal [Testes Tenderness] : no tenderness of the testes [Testes Mass (___cm)] : there were no testicular masses [General Appearance - Well Developed] : well developed [General Appearance - Well Nourished] : well nourished [Normal Appearance] : normal appearance [Well Groomed] : well groomed [General Appearance - In No Acute Distress] : no acute distress [Abdomen Soft] : soft [Abdomen Tenderness] : non-tender [Costovertebral Angle Tenderness] : no ~M costovertebral angle tenderness [] : no respiratory distress [Respiration, Rhythm And Depth] : normal respiratory rhythm and effort [Exaggerated Use Of Accessory Muscles For Inspiration] : no accessory muscle use [Normal Station and Gait] : the gait and station were normal for the patient's age

## 2023-01-30 NOTE — ASSESSMENT
[FreeTextEntry1] : Previously labs and renal imaging were normal.  PSA and basic metabolic panel will be checked.  He will undergo CT scan for repeat imaging in follow-up to kidney cancer.  We will discuss results on the phone.  When he goes home he will check to make sure he is taking tamsulosin.  If he is taking it every day, then he could change it to twice a day to see if symptoms improve.  Risk of orthostatic hypotension discussed.  He will follow-up in 6 months pending results.

## 2023-01-31 LAB
ANION GAP SERPL CALC-SCNC: 10 MMOL/L
BUN SERPL-MCNC: 24 MG/DL
CALCIUM SERPL-MCNC: 9.7 MG/DL
CHLORIDE SERPL-SCNC: 105 MMOL/L
CO2 SERPL-SCNC: 27 MMOL/L
CREAT SERPL-MCNC: 1 MG/DL
EGFR: 72 ML/MIN/1.73M2
GLUCOSE SERPL-MCNC: 115 MG/DL
POTASSIUM SERPL-SCNC: 4.8 MMOL/L
PSA SERPL-MCNC: 0.07 NG/ML
SODIUM SERPL-SCNC: 141 MMOL/L

## 2023-02-16 ENCOUNTER — OUTPATIENT (OUTPATIENT)
Dept: OUTPATIENT SERVICES | Facility: HOSPITAL | Age: 88
LOS: 1 days | End: 2023-02-16
Payer: MEDICARE

## 2023-02-16 ENCOUNTER — APPOINTMENT (OUTPATIENT)
Dept: CT IMAGING | Facility: CLINIC | Age: 88
End: 2023-02-16
Payer: MEDICARE

## 2023-02-16 DIAGNOSIS — C64.1 MALIGNANT NEOPLASM OF RIGHT KIDNEY, EXCEPT RENAL PELVIS: ICD-10-CM

## 2023-02-16 DIAGNOSIS — Z95.5 PRESENCE OF CORONARY ANGIOPLASTY IMPLANT AND GRAFT: Chronic | ICD-10-CM

## 2023-02-16 DIAGNOSIS — Z96.649 PRESENCE OF UNSPECIFIED ARTIFICIAL HIP JOINT: Chronic | ICD-10-CM

## 2023-02-16 PROCEDURE — 74178 CT ABD&PLV WO CNTR FLWD CNTR: CPT

## 2023-02-16 PROCEDURE — 74178 CT ABD&PLV WO CNTR FLWD CNTR: CPT | Mod: 26,MH

## 2023-02-21 ENCOUNTER — NON-APPOINTMENT (OUTPATIENT)
Age: 88
End: 2023-02-21

## 2023-02-23 NOTE — DISCHARGE NOTE PROVIDER - NSDCACTIVITY_GEN_ALL_CORE
Patient called and sched with Kim Michel 3/20   Walking - Indoors allowed/No heavy lifting/straining/Showering allowed/Stairs allowed/Walking - Outdoors allowed

## 2023-03-31 ENCOUNTER — RX RENEWAL (OUTPATIENT)
Age: 88
End: 2023-03-31

## 2023-03-31 RX ORDER — TAMSULOSIN HYDROCHLORIDE 0.4 MG/1
0.4 CAPSULE ORAL
Qty: 90 | Refills: 1 | Status: ACTIVE | COMMUNITY
Start: 2022-01-18 | End: 1900-01-01

## 2023-05-18 ENCOUNTER — NON-APPOINTMENT (OUTPATIENT)
Age: 88
End: 2023-05-18

## 2023-06-06 ENCOUNTER — NON-APPOINTMENT (OUTPATIENT)
Age: 88
End: 2023-06-06

## 2023-08-03 ENCOUNTER — APPOINTMENT (OUTPATIENT)
Dept: UROLOGY | Facility: CLINIC | Age: 88
End: 2023-08-03

## 2024-05-22 NOTE — ED ADULT TRIAGE NOTE - BP NONINVASIVE SYSTOLIC (MM [facility])
Disp Refills Start End    doxycycline hyclate (VIBRA-TABS) 100 MG tablet 20 tablet 0 5/22/2024 6/1/2024    Was called in for the patient today.   120

## 2024-12-09 NOTE — ASU PATIENT PROFILE, ADULT - NS TRANSFER EYEGLASSES PAIRS
Standing Expiration Date:   12/9/2025     Orders Placed This Encounter   Medications    sertraline (ZOLOFT) 25 MG tablet     Sig: Take 1 tablet by mouth daily     Dispense:  90 tablet     Refill:  2    fluticasone (FLONASE) 50 MCG/ACT nasal spray     Sig: Take 1 spray each nostril at night     Dispense:  3 each     Refill:  3     Medications Discontinued During This Encounter   Medication Reason    fluticasone (FLONASE) 50 MCG/ACT nasal spray REORDER    sertraline (ZOLOFT) 25 MG tablet REORDER     Return in about 6 months (around 6/9/2025) for mood recheck.      Reviewed with the patient: current clinical status,medications, activities and diet.     Side effects, adverse effects of the medication prescribed today, as well as treatment plan/ rationale and result expectations have been discussed with the patient who expresses understanding and desires to proceed.    Close follow up to evaluate treatment results and for coordination of care.  I have reviewed the patient's medical history in detail and updated the computerized patient record.    Please note, this report has been partially produced using speech recognition software and may cause  and /or contain errors related to that system including grammar, punctuation and spelling as well as words and phrases that may seem inappropriate. If there are questions or concerns please feel free to contact me to clarify.    The patient (or guardian, if applicable) and other individuals in attendance with the patient were advised that Artificial Intelligence will be utilized during this visit to record, process the conversation to generate a clinical note, and support improvement of the AI technology. The patient (or guardian, if applicable) and other individuals in attendance at the appointment consented to the use of AI, including the recording.      Delma Tello, APRN - CNP   none